# Patient Record
Sex: MALE | Race: WHITE | NOT HISPANIC OR LATINO | Employment: FULL TIME | URBAN - NONMETROPOLITAN AREA
[De-identification: names, ages, dates, MRNs, and addresses within clinical notes are randomized per-mention and may not be internally consistent; named-entity substitution may affect disease eponyms.]

---

## 2017-03-09 RX ORDER — FLUTICASONE PROPIONATE 50 MCG
SPRAY, SUSPENSION (ML) NASAL
Qty: 1 EACH | Refills: 6 | Status: SHIPPED | OUTPATIENT
Start: 2017-03-09

## 2017-05-08 ENCOUNTER — OFFICE VISIT (OUTPATIENT)
Dept: FAMILY MEDICINE CLINIC | Facility: CLINIC | Age: 37
End: 2017-05-08

## 2017-05-08 VITALS
WEIGHT: 211 LBS | TEMPERATURE: 97.5 F | BODY MASS INDEX: 26.37 KG/M2 | DIASTOLIC BLOOD PRESSURE: 85 MMHG | SYSTOLIC BLOOD PRESSURE: 137 MMHG | OXYGEN SATURATION: 96 % | HEART RATE: 78 BPM | RESPIRATION RATE: 16 BRPM

## 2017-05-08 DIAGNOSIS — R53.81 MALAISE AND FATIGUE: ICD-10-CM

## 2017-05-08 DIAGNOSIS — E53.8 VITAMIN B12 DEFICIENCY: ICD-10-CM

## 2017-05-08 DIAGNOSIS — R53.83 MALAISE AND FATIGUE: ICD-10-CM

## 2017-05-08 DIAGNOSIS — R06.02 SHORTNESS OF BREATH: ICD-10-CM

## 2017-05-08 DIAGNOSIS — R51.9 PERSISTENT HEADACHES: ICD-10-CM

## 2017-05-08 DIAGNOSIS — R42 DIZZINESS AND GIDDINESS: ICD-10-CM

## 2017-05-08 DIAGNOSIS — E55.9 VITAMIN D DEFICIENCY: ICD-10-CM

## 2017-05-08 DIAGNOSIS — R07.89 OTHER CHEST PAIN: Primary | ICD-10-CM

## 2017-05-08 DIAGNOSIS — R22.31 AXILLARY MASS, RIGHT: ICD-10-CM

## 2017-05-08 PROCEDURE — 99214 OFFICE O/P EST MOD 30 MIN: CPT | Performed by: INTERNAL MEDICINE

## 2017-05-08 RX ORDER — LORATADINE 10 MG/1
CAPSULE, LIQUID FILLED ORAL
COMMUNITY
End: 2021-08-30

## 2017-07-10 ENCOUNTER — TELEPHONE (OUTPATIENT)
Dept: FAMILY MEDICINE CLINIC | Facility: CLINIC | Age: 37
End: 2017-07-10

## 2017-07-11 NOTE — TELEPHONE ENCOUNTER
Left message to return call. Need updated number for patient, number is no longer a working number.

## 2017-07-12 NOTE — TELEPHONE ENCOUNTER
Still unable to reach patient or wife, left message informing that 'test must be done per specialist only' and to call with any additional questions.

## 2017-08-01 ENCOUNTER — TELEPHONE (OUTPATIENT)
Dept: FAMILY MEDICINE CLINIC | Facility: CLINIC | Age: 37
End: 2017-08-01

## 2017-08-11 ENCOUNTER — APPOINTMENT (OUTPATIENT)
Dept: CT IMAGING | Facility: HOSPITAL | Age: 37
End: 2017-08-11

## 2017-09-25 ENCOUNTER — TELEPHONE (OUTPATIENT)
Dept: FAMILY MEDICINE CLINIC | Facility: CLINIC | Age: 37
End: 2017-09-25

## 2017-09-25 RX ORDER — CEPHALEXIN 500 MG/1
500 CAPSULE ORAL 3 TIMES DAILY
Qty: 30 CAPSULE | Refills: 0 | Status: SHIPPED | OUTPATIENT
Start: 2017-09-25 | End: 2017-10-05

## 2017-09-27 ENCOUNTER — OFFICE VISIT (OUTPATIENT)
Dept: SURGERY | Facility: CLINIC | Age: 37
End: 2017-09-27

## 2017-09-27 ENCOUNTER — OFFICE VISIT (OUTPATIENT)
Dept: PULMONOLOGY | Facility: CLINIC | Age: 37
End: 2017-09-27

## 2017-09-27 ENCOUNTER — OFFICE VISIT (OUTPATIENT)
Dept: NEUROLOGY | Facility: CLINIC | Age: 37
End: 2017-09-27

## 2017-09-27 VITALS
HEART RATE: 74 BPM | WEIGHT: 207 LBS | HEIGHT: 75 IN | RESPIRATION RATE: 16 BRPM | OXYGEN SATURATION: 97 % | SYSTOLIC BLOOD PRESSURE: 110 MMHG | DIASTOLIC BLOOD PRESSURE: 80 MMHG | BODY MASS INDEX: 25.74 KG/M2

## 2017-09-27 VITALS
HEART RATE: 71 BPM | HEIGHT: 75 IN | SYSTOLIC BLOOD PRESSURE: 140 MMHG | WEIGHT: 208 LBS | BODY MASS INDEX: 25.86 KG/M2 | TEMPERATURE: 98.1 F | OXYGEN SATURATION: 99 % | DIASTOLIC BLOOD PRESSURE: 76 MMHG

## 2017-09-27 VITALS
OXYGEN SATURATION: 96 % | WEIGHT: 207.8 LBS | SYSTOLIC BLOOD PRESSURE: 138 MMHG | HEART RATE: 81 BPM | BODY MASS INDEX: 25.84 KG/M2 | HEIGHT: 75 IN | DIASTOLIC BLOOD PRESSURE: 72 MMHG

## 2017-09-27 DIAGNOSIS — G43.019 INTRACTABLE MIGRAINE WITHOUT AURA AND WITHOUT STATUS MIGRAINOSUS: Primary | ICD-10-CM

## 2017-09-27 DIAGNOSIS — D17.21 LIPOMA OF RIGHT UPPER EXTREMITY: ICD-10-CM

## 2017-09-27 DIAGNOSIS — R06.02 SHORTNESS OF BREATH: Primary | ICD-10-CM

## 2017-09-27 DIAGNOSIS — R22.31 AXILLARY MASS, RIGHT: Primary | ICD-10-CM

## 2017-09-27 DIAGNOSIS — F51.01 PRIMARY INSOMNIA: ICD-10-CM

## 2017-09-27 DIAGNOSIS — J30.89 OTHER ALLERGIC RHINITIS: ICD-10-CM

## 2017-09-27 DIAGNOSIS — J45.40 MODERATE PERSISTENT ASTHMA WITHOUT COMPLICATION: ICD-10-CM

## 2017-09-27 PROCEDURE — 99204 OFFICE O/P NEW MOD 45 MIN: CPT | Performed by: SURGERY

## 2017-09-27 PROCEDURE — 10022 PR FINE NEEDLE ASP;W/IMAGING GUIDANCE: CPT | Performed by: SURGERY

## 2017-09-27 PROCEDURE — 99244 OFF/OP CNSLTJ NEW/EST MOD 40: CPT | Performed by: INTERNAL MEDICINE

## 2017-09-27 PROCEDURE — 99214 OFFICE O/P EST MOD 30 MIN: CPT | Performed by: NURSE PRACTITIONER

## 2017-09-27 PROCEDURE — 95012 NITRIC OXIDE EXP GAS DETER: CPT | Performed by: INTERNAL MEDICINE

## 2017-09-27 RX ORDER — ALBUTEROL SULFATE 90 UG/1
2 AEROSOL, METERED RESPIRATORY (INHALATION) EVERY 4 HOURS PRN
Qty: 1 INHALER | Refills: 5 | Status: SHIPPED | OUTPATIENT
Start: 2017-09-27

## 2017-09-27 RX ORDER — IBUPROFEN 200 MG
200 TABLET ORAL EVERY 6 HOURS PRN
COMMUNITY

## 2017-09-27 RX ORDER — AMITRIPTYLINE HYDROCHLORIDE 25 MG/1
50 TABLET, FILM COATED ORAL NIGHTLY
Qty: 30 TABLET | Refills: 3 | Status: SHIPPED | OUTPATIENT
Start: 2017-09-27 | End: 2021-06-11

## 2017-09-27 NOTE — PROGRESS NOTES
Patient: Horace Butler    YOB: 1980    Date: 09/27/2017    Primary Care Provider: Karina Chapa MD    Chief complaint:   Chief Complaint   Patient presents with   • Mass     Right axillary mass        Subjective .     History of present illness:  The patient is here today for the evaluation and treatment for a right axillary mass.  He first noticed a mass under his right arm three years ago and is now causing discomfort.  He states that the pain under his arm does radiate to his chest.  He has had a ct scan and ultrasound in the past.  The patient denies redness or drainage.  CT scan of the right axilla was negative, ultrasound results are unknown.  Patient also had a history of a thyroid nodule with negative FNA.  Patient had occasions of redness but no drainage and no open wound.  No history of hidradenitis.    Review of Systems   Constitutional: Positive for chills. Negative for fever and unexpected weight change.   HENT: Negative for trouble swallowing and voice change.    Eyes: Negative for visual disturbance.   Respiratory: Positive for shortness of breath. Negative for apnea, cough, chest tightness and wheezing.    Cardiovascular: Positive for chest pain. Negative for palpitations and leg swelling.   Gastrointestinal: Negative for abdominal distention, abdominal pain, anal bleeding, blood in stool, constipation, diarrhea, nausea, rectal pain and vomiting.   Endocrine: Negative for cold intolerance and heat intolerance.   Genitourinary: Negative for difficulty urinating, dysuria, flank pain, scrotal swelling and testicular pain.   Musculoskeletal: Negative for back pain, gait problem and joint swelling.   Skin: Negative for color change, rash and wound.   Neurological: Negative for dizziness, syncope, speech difficulty, weakness, numbness and headaches.   Hematological: Negative for adenopathy. Does not bruise/bleed easily.   Psychiatric/Behavioral: Negative for confusion. The patient is not  "nervous/anxious.        Allergies:  No Known Allergies    Medications:    Current Outpatient Prescriptions:   •  amitriptyline (ELAVIL) 50 MG tablet, Take  by mouth., Disp: , Rfl:   •  azelastine (ASTELIN) 0.1 % nasal spray, instill 2 sprays into each nostril once daily, Disp: , Rfl: 0  •  cephalexin (KEFLEX) 500 MG capsule, Take 1 capsule by mouth 3 (Three) Times a Day for 10 days., Disp: 30 capsule, Rfl: 0  •  Cetirizine HCl (ZYRTEC ALLERGY) 10 MG capsule, Take 1 capsule by mouth., Disp: , Rfl:   •  fluticasone (FLONASE) 50 MCG/ACT nasal spray, One spray in each nostril twice daily., Disp: 1 each, Rfl: 6  •  Loratadine (CLARITIN) 10 MG capsule, Take  by mouth., Disp: , Rfl:   •  oxymetazoline (AFRIN 12 HOUR) 0.05 % nasal spray, into each nostril 2 (two) times a day., Disp: , Rfl:   •  SUMAtriptan (IMITREX) 50 MG tablet, Take one tablet at onset of headache. May repeat dose one time in 2 hours if headache not relieved., Disp: 10 tablet, Rfl: 6    History\"  Past Medical History:   Diagnosis Date   • Arthritis    • Asthma    • Cardiac murmur    • Hyperlipidemia    • Migraine        Past Surgical History:   Procedure Laterality Date   • TOOTH EXTRACTION         Family History   Problem Relation Age of Onset   • Other Mother      ARTERIOSCLEROTIC CARDIOVASCULAR DISEASE, CEREBROVASCULAR ACCIDENT   • Arthritis Mother    • Hypertension Mother    • Migraines Mother    • Thyroid disease Mother    • Stroke Mother    • Lung cancer Father    • Other Other      ARTERIOSCLEROTIC CARDIOVASCULAR DISEASE,CEREBROVASCULAR ACCIDENT   • Arthritis Other    • Stroke Other    • Hypertension Other        Social History   Substance Use Topics   • Smoking status: Former Smoker     Types: Cigarettes, Electronic Cigarette   • Smokeless tobacco: Former User      Comment: Patient quit cigarettes but still smokes an electronic cigarette   • Alcohol use Yes      Comment: minimum consumption        Objective     Vital Signs:   /76 (BP " "Location: Right arm)  Pulse 71  Temp 98.1 °F (36.7 °C) (Temporal Artery )   Ht 75\" (190.5 cm)  Wt 208 lb (94.3 kg)  SpO2 99%  BMI 26 kg/m2    Physical Exam:   General Appearance:    Alert, cooperative, in no acute distress   Head:    Normocephalic, without obvious abnormality, atraumatic   Eyes:            Lids and lashes normal, conjunctivae and sclerae normal, no   icterus, no pallor, corneas clear, PERRLA   Ears:    Ears appear intact with no abnormalities noted   Throat:   No oral lesions, no thrush, oral mucosa moist   Neck:   No adenopathy, supple, trachea midline, no thyromegaly, no   carotid bruit, no JVD   Lungs:     Clear to auscultation,respirations regular, even and                  unlabored    Heart:    Regular rhythm and normal rate, normal S1 and S2, no            murmur, no gallop, no rub, no click   Chest Wall:    No abnormalities observed   Abdomen:     Normal bowel sounds, no masses, no organomegaly, soft        non-tender, non-distended, no guarding, no rebound                tenderness   Extremities:   Moves all extremities well, no edema, no cyanosis, no             Redness.  5 cm lipoma right axilla no skin involvement.     Pulses:   Pulses palpable and equal bilaterally   Skin:   No bleeding, bruising or rash   Lymph nodes:   No palpable adenopathy   Neurologic:   Cranial nerves 2 - 12 grossly intact, sensation intact, DTR       present and equal bilaterally     Results Review:   I reviewed the patient's new clinical results.    Assessment/Plan     1. Axillary mass, right    2. Lipoma of right upper extremity        Schedule ultrasound right axilla today. Patient has an underlying lymph node on the right.  Associated with a mass.  Would recommend FNA today.  Procedure: FNA right axilla with ultrasound guidance    I recommend a FNA of the right breast lesion. The procedure and risks were clearly explained including bleeding, infection and requirement for re-biopsy and the patient " understood these and wishes to proceed.  Follow-up one week to review pathology result.    The patient was brought to the procedure room. Consent and time out were performed. The area was prepped and draped in the usual fashion. 1% lidocaine with epinephrine was infused locally. A 20G needle was used for biopsy under ultrasound guidance. Minimal blood loss had occurred and hemostasis had been well controlled with pressure. . The patient tolerated the procedure and there were no complications. We will make a f/u appointment to discuss results.      I discussed the patients findings and my recommendations with patient    Review of Systems was reviewed and confirmed as accurate today.    Electronically signed by Arya Oh MD  09/27/17      .    Scribed for Arya Oh MD by Mikayla De. 9/27/2017  9:48 AM

## 2017-10-02 ENCOUNTER — OFFICE VISIT (OUTPATIENT)
Dept: SURGERY | Facility: CLINIC | Age: 37
End: 2017-10-02

## 2017-10-02 VITALS
BODY MASS INDEX: 26.24 KG/M2 | SYSTOLIC BLOOD PRESSURE: 130 MMHG | WEIGHT: 211 LBS | DIASTOLIC BLOOD PRESSURE: 70 MMHG | TEMPERATURE: 97.8 F | HEIGHT: 75 IN | HEART RATE: 71 BPM | OXYGEN SATURATION: 99 %

## 2017-10-02 DIAGNOSIS — R59.0 LYMPHADENOPATHY OF HEAD AND NECK REGION: Primary | ICD-10-CM

## 2017-10-02 PROCEDURE — 99213 OFFICE O/P EST LOW 20 MIN: CPT | Performed by: SURGERY

## 2017-10-02 NOTE — PROGRESS NOTES
"Patient: Horace Butler    YOB: 1980    Date: 10/02/2017    Primary Care Provider: Karina Chapa MD    Chief Complaint:   Chief Complaint   Patient presents with   • Follow-up     patient is here for follow up on right axillary biopsy       History: Patient is here for a follow up on right axillary biopsy.  Pathology shows negative for malignant cells.  Patient states that he is having more pain with the area. Patient state that he has been having trouble moving his arm since the biopsy.   Patient states that the pain is throbbing and sharp.  Patient states that the pain is constant.No fever or chills.  Patient relieved that pathology report is benign.    Review of Systems    Vital Signs  /70  Pulse 71  Temp 97.8 °F (36.6 °C)  Ht 75\" (190.5 cm)  Wt 211 lb (95.7 kg)  SpO2 99%  BMI 26.37 kg/m2    Allergies:  No Known Allergies    Medications:    Current Outpatient Prescriptions:   •  albuterol (VENTOLIN HFA) 108 (90 Base) MCG/ACT inhaler, Inhale 2 puffs Every 4 (Four) Hours As Needed for Wheezing or Shortness of Air., Disp: 1 inhaler, Rfl: 5  •  amitriptyline (ELAVIL) 25 MG tablet, Take 2 tablets by mouth Every Night., Disp: 30 tablet, Rfl: 3  •  azelastine (ASTELIN) 0.1 % nasal spray, instill 2 sprays into each nostril once daily, Disp: , Rfl: 0  •  cephalexin (KEFLEX) 500 MG capsule, Take 1 capsule by mouth 3 (Three) Times a Day for 10 days., Disp: 30 capsule, Rfl: 0  •  Cetirizine HCl (ZYRTEC ALLERGY) 10 MG capsule, Take 1 capsule by mouth., Disp: , Rfl:   •  fluticasone (FLONASE) 50 MCG/ACT nasal spray, One spray in each nostril twice daily., Disp: 1 each, Rfl: 6  •  ibuprofen (ADVIL,MOTRIN) 200 MG tablet, Take 200 mg by mouth Every 6 (Six) Hours As Needed for Mild Pain ., Disp: , Rfl:   •  Loratadine (CLARITIN) 10 MG capsule, Take  by mouth., Disp: , Rfl:   •  mometasone-formoterol (DULERA 200) 200-5 MCG/ACT inhaler, Inhale 2 puffs 2 (Two) Times a Day. Rinse mouth with water after use., " Disp: 1 g, Rfl: 5  •  oxymetazoline (AFRIN 12 HOUR) 0.05 % nasal spray, into each nostril 2 (two) times a day., Disp: , Rfl:   •  SUMAtriptan (IMITREX) 50 MG tablet, Take one tablet at onset of headache. May repeat dose one time in 2 hours if headache not relieved., Disp: 10 tablet, Rfl: 6    Physical Exam:   General Appearance:    Alert, cooperative, in no acute distress   Head:    Normocephalic, without obvious abnormality, atraumatic   Lungs:     Clear to auscultation,respirations regular, even and                  unlabored    Heart:    Regular rhythm and normal rate, normal S1 and S2, no            murmur, no gallop, no rub, no click   Abdomen:     Normal bowel sounds, no masses, no organomegaly, soft        non-tender, non-distended, no guarding, no rebound                tenderness   Extremities:   Moves all extremities well, no edema, no cyanosis, no             redness   Pulses:   Pulses palpable and equal bilaterally   Skin:   No bleeding, bruising or rash      Assessment/Plan     1. Lymphadenopathy of head and neck region      Recommend repeat ultrasound in 3 months.  If symptoms do not improve, patient may need for resection of the mass.  Electronically signed by Arya Oh MD  10/02/17   Scribed for Arya Oh MD by Sheeba Patel. 10/2/2017  4:31 PM

## 2017-10-13 ENCOUNTER — APPOINTMENT (OUTPATIENT)
Dept: CT IMAGING | Facility: HOSPITAL | Age: 37
End: 2017-10-13

## 2017-11-10 ENCOUNTER — HOSPITAL ENCOUNTER (OUTPATIENT)
Dept: SLEEP MEDICINE | Facility: HOSPITAL | Age: 37
Setting detail: THERAPIES SERIES
End: 2017-11-10

## 2018-05-10 ENCOUNTER — TELEPHONE (OUTPATIENT)
Dept: INTERNAL MEDICINE | Facility: CLINIC | Age: 38
End: 2018-05-10

## 2018-05-10 DIAGNOSIS — J30.1 CHRONIC SEASONAL ALLERGIC RHINITIS DUE TO POLLEN: Primary | ICD-10-CM

## 2018-08-31 ENCOUNTER — OFFICE VISIT (OUTPATIENT)
Dept: NEUROLOGY | Facility: CLINIC | Age: 38
End: 2018-08-31

## 2018-08-31 VITALS
HEART RATE: 75 BPM | WEIGHT: 211 LBS | OXYGEN SATURATION: 98 % | SYSTOLIC BLOOD PRESSURE: 130 MMHG | BODY MASS INDEX: 26.24 KG/M2 | DIASTOLIC BLOOD PRESSURE: 82 MMHG | HEIGHT: 75 IN

## 2018-08-31 DIAGNOSIS — G47.19 EXCESSIVE DAYTIME SLEEPINESS: ICD-10-CM

## 2018-08-31 DIAGNOSIS — G43.019 INTRACTABLE MIGRAINE WITHOUT AURA AND WITHOUT STATUS MIGRAINOSUS: Primary | ICD-10-CM

## 2018-08-31 DIAGNOSIS — G43.119 INTRACTABLE MIGRAINE WITH AURA WITHOUT STATUS MIGRAINOSUS: ICD-10-CM

## 2018-08-31 DIAGNOSIS — F51.04 PSYCHOPHYSIOLOGICAL INSOMNIA: ICD-10-CM

## 2018-08-31 DIAGNOSIS — R41.3 MEMORY LOSS: ICD-10-CM

## 2018-08-31 PROCEDURE — 99214 OFFICE O/P EST MOD 30 MIN: CPT | Performed by: NURSE PRACTITIONER

## 2018-10-26 ENCOUNTER — OFFICE VISIT (OUTPATIENT)
Dept: INTERNAL MEDICINE | Facility: CLINIC | Age: 38
End: 2018-10-26

## 2018-10-26 ENCOUNTER — APPOINTMENT (OUTPATIENT)
Dept: LAB | Facility: HOSPITAL | Age: 38
End: 2018-10-26

## 2018-10-26 VITALS
OXYGEN SATURATION: 97 % | HEART RATE: 92 BPM | TEMPERATURE: 97.6 F | RESPIRATION RATE: 16 BRPM | HEIGHT: 75 IN | DIASTOLIC BLOOD PRESSURE: 82 MMHG | SYSTOLIC BLOOD PRESSURE: 120 MMHG | BODY MASS INDEX: 25.74 KG/M2 | WEIGHT: 207 LBS

## 2018-10-26 DIAGNOSIS — J01.00 ACUTE MAXILLARY SINUSITIS, RECURRENCE NOT SPECIFIED: ICD-10-CM

## 2018-10-26 DIAGNOSIS — R53.83 FATIGUE, UNSPECIFIED TYPE: Primary | ICD-10-CM

## 2018-10-26 LAB
25(OH)D3 SERPL-MCNC: 32.6 NG/ML
BASOPHILS # BLD AUTO: 0.06 10*3/MM3 (ref 0–0.2)
BASOPHILS NFR BLD AUTO: 0.7 % (ref 0–2.5)
DEPRECATED RDW RBC AUTO: 36.6 FL (ref 37–54)
EOSINOPHIL # BLD AUTO: 0.14 10*3/MM3 (ref 0–0.7)
EOSINOPHIL NFR BLD AUTO: 1.7 % (ref 0–7)
ERYTHROCYTE [DISTWIDTH] IN BLOOD BY AUTOMATED COUNT: 11.4 % (ref 11.5–14.5)
HCT VFR BLD AUTO: 45.6 % (ref 42–52)
HGB BLD-MCNC: 15.3 G/DL (ref 14–18)
IMM GRANULOCYTES # BLD: 0.04 10*3/MM3 (ref 0–0.06)
IMM GRANULOCYTES NFR BLD: 0.5 % (ref 0–0.6)
LYMPHOCYTES # BLD AUTO: 2.56 10*3/MM3 (ref 0.6–3.4)
LYMPHOCYTES NFR BLD AUTO: 31.4 % (ref 10–50)
MCH RBC QN AUTO: 29.7 PG (ref 27–31)
MCHC RBC AUTO-ENTMCNC: 33.6 G/DL (ref 30–37)
MCV RBC AUTO: 88.5 FL (ref 80–94)
MONOCYTES # BLD AUTO: 0.63 10*3/MM3 (ref 0–0.9)
MONOCYTES NFR BLD AUTO: 7.7 % (ref 0–12)
NEUTROPHILS # BLD AUTO: 4.73 10*3/MM3 (ref 2–6.9)
NEUTROPHILS NFR BLD AUTO: 58 % (ref 37–80)
NRBC BLD MANUAL-RTO: 0 /100 WBC (ref 0–0)
PLATELET # BLD AUTO: 411 10*3/MM3 (ref 130–400)
PMV BLD AUTO: 9.1 FL (ref 6–12)
RBC # BLD AUTO: 5.15 10*6/MM3 (ref 4.7–6.1)
WBC NRBC COR # BLD: 8.16 10*3/MM3 (ref 4.8–10.8)

## 2018-10-26 PROCEDURE — 85025 COMPLETE CBC W/AUTO DIFF WBC: CPT | Performed by: NURSE PRACTITIONER

## 2018-10-26 PROCEDURE — 84403 ASSAY OF TOTAL TESTOSTERONE: CPT | Performed by: NURSE PRACTITIONER

## 2018-10-26 PROCEDURE — 36415 COLL VENOUS BLD VENIPUNCTURE: CPT | Performed by: NURSE PRACTITIONER

## 2018-10-26 PROCEDURE — 99214 OFFICE O/P EST MOD 30 MIN: CPT | Performed by: NURSE PRACTITIONER

## 2018-10-26 PROCEDURE — 84402 ASSAY OF FREE TESTOSTERONE: CPT | Performed by: NURSE PRACTITIONER

## 2018-10-26 PROCEDURE — 82306 VITAMIN D 25 HYDROXY: CPT | Performed by: NURSE PRACTITIONER

## 2018-10-26 RX ORDER — AMOXICILLIN AND CLAVULANATE POTASSIUM 875; 125 MG/1; MG/1
1 TABLET, FILM COATED ORAL 2 TIMES DAILY
Qty: 20 TABLET | Refills: 0 | Status: SHIPPED | OUTPATIENT
Start: 2018-10-26 | End: 2018-11-05

## 2018-10-29 ENCOUNTER — TELEPHONE (OUTPATIENT)
Dept: INTERNAL MEDICINE | Facility: CLINIC | Age: 38
End: 2018-10-29

## 2018-10-29 LAB
TESTOST FREE SERPL-MCNC: 1.2 PG/ML (ref 8.7–25.1)
TESTOST SERPL-MCNC: 119.4 NG/DL (ref 264–916)

## 2018-10-30 ENCOUNTER — TELEPHONE (OUTPATIENT)
Dept: INTERNAL MEDICINE | Facility: CLINIC | Age: 38
End: 2018-10-30

## 2018-11-01 ENCOUNTER — TELEPHONE (OUTPATIENT)
Dept: INTERNAL MEDICINE | Facility: CLINIC | Age: 38
End: 2018-11-01

## 2018-11-02 ENCOUNTER — APPOINTMENT (OUTPATIENT)
Dept: SLEEP MEDICINE | Facility: HOSPITAL | Age: 38
End: 2018-11-02

## 2018-11-09 ENCOUNTER — APPOINTMENT (OUTPATIENT)
Dept: MRI IMAGING | Facility: HOSPITAL | Age: 38
End: 2018-11-09

## 2018-11-15 DIAGNOSIS — R79.89 LOW TESTOSTERONE: Primary | ICD-10-CM

## 2018-12-07 ENCOUNTER — APPOINTMENT (OUTPATIENT)
Dept: SLEEP MEDICINE | Facility: HOSPITAL | Age: 38
End: 2018-12-07

## 2018-12-07 ENCOUNTER — HOSPITAL ENCOUNTER (OUTPATIENT)
Dept: MRI IMAGING | Facility: HOSPITAL | Age: 38
Discharge: HOME | End: 2018-12-07
Admitting: NURSE PRACTITIONER

## 2018-12-07 DIAGNOSIS — G43.019 INTRACTABLE MIGRAINE WITHOUT AURA AND WITHOUT STATUS MIGRAINOSUS: ICD-10-CM

## 2018-12-07 PROCEDURE — 70551 MRI BRAIN STEM W/O DYE: CPT

## 2018-12-19 ENCOUNTER — OFFICE VISIT (OUTPATIENT)
Dept: NEUROLOGY | Facility: CLINIC | Age: 38
End: 2018-12-19

## 2018-12-19 VITALS — WEIGHT: 207 LBS | OXYGEN SATURATION: 98 % | BODY MASS INDEX: 25.74 KG/M2 | HEART RATE: 76 BPM | HEIGHT: 75 IN

## 2018-12-19 DIAGNOSIS — G43.119 INTRACTABLE MIGRAINE WITH AURA WITHOUT STATUS MIGRAINOSUS: Primary | ICD-10-CM

## 2018-12-19 DIAGNOSIS — G47.19 EXCESSIVE DAYTIME SLEEPINESS: ICD-10-CM

## 2018-12-19 PROCEDURE — 99213 OFFICE O/P EST LOW 20 MIN: CPT | Performed by: NURSE PRACTITIONER

## 2019-01-01 PROBLEM — G47.19 EXCESSIVE DAYTIME SLEEPINESS: Status: ACTIVE | Noted: 2019-01-01

## 2019-06-20 ENCOUNTER — OFFICE VISIT (OUTPATIENT)
Dept: INTERNAL MEDICINE | Facility: CLINIC | Age: 39
End: 2019-06-20

## 2019-06-20 VITALS
TEMPERATURE: 97.6 F | RESPIRATION RATE: 15 BRPM | BODY MASS INDEX: 25.61 KG/M2 | WEIGHT: 206 LBS | OXYGEN SATURATION: 100 % | DIASTOLIC BLOOD PRESSURE: 85 MMHG | SYSTOLIC BLOOD PRESSURE: 136 MMHG | HEART RATE: 73 BPM | HEIGHT: 75 IN

## 2019-06-20 DIAGNOSIS — R79.89 ELEVATED PLATELET COUNT: ICD-10-CM

## 2019-06-20 DIAGNOSIS — R00.2 PALPITATIONS: ICD-10-CM

## 2019-06-20 DIAGNOSIS — R73.9 HYPERGLYCEMIA: ICD-10-CM

## 2019-06-20 DIAGNOSIS — R42 DIZZINESS: ICD-10-CM

## 2019-06-20 DIAGNOSIS — R53.83 FATIGUE, UNSPECIFIED TYPE: Primary | ICD-10-CM

## 2019-06-20 PROCEDURE — 99214 OFFICE O/P EST MOD 30 MIN: CPT | Performed by: NURSE PRACTITIONER

## 2019-06-20 PROCEDURE — 93000 ELECTROCARDIOGRAM COMPLETE: CPT | Performed by: NURSE PRACTITIONER

## 2019-06-20 RX ORDER — MECLIZINE HYDROCHLORIDE 25 MG/1
25 TABLET ORAL 3 TIMES DAILY PRN
Qty: 60 TABLET | Refills: 0 | Status: SHIPPED | OUTPATIENT
Start: 2019-06-20 | End: 2019-08-22 | Stop reason: SDUPTHER

## 2019-06-21 LAB
ALBUMIN SERPL-MCNC: 4.9 G/DL (ref 3.5–5)
ALBUMIN/GLOB SERPL: 1.4 G/DL (ref 1–2)
ALP SERPL-CCNC: 112 U/L (ref 38–126)
ALT SERPL-CCNC: 36 U/L (ref 13–69)
AST SERPL-CCNC: 31 U/L (ref 15–46)
BASOPHILS # BLD AUTO: 0.04 10*3/MM3 (ref 0–0.2)
BASOPHILS NFR BLD AUTO: 0.7 % (ref 0–1.5)
BILIRUB SERPL-MCNC: 0.3 MG/DL (ref 0.2–1.3)
BUN SERPL-MCNC: 15 MG/DL (ref 7–20)
BUN/CREAT SERPL: 15 (ref 6.3–21.9)
CALCIUM SERPL-MCNC: 10.1 MG/DL (ref 8.4–10.2)
CHLORIDE SERPL-SCNC: 97 MMOL/L (ref 98–107)
CO2 SERPL-SCNC: 31 MMOL/L (ref 26–30)
CREAT SERPL-MCNC: 1 MG/DL (ref 0.6–1.3)
EOSINOPHIL # BLD AUTO: 0.15 10*3/MM3 (ref 0–0.4)
EOSINOPHIL NFR BLD AUTO: 2.6 % (ref 0.3–6.2)
ERYTHROCYTE [DISTWIDTH] IN BLOOD BY AUTOMATED COUNT: 12.1 % (ref 12.3–15.4)
GLOBULIN SER CALC-MCNC: 3.4 GM/DL
GLUCOSE SERPL-MCNC: 94 MG/DL (ref 74–98)
HBA1C MFR BLD: 5.9 % (ref 4.8–5.6)
HCT VFR BLD AUTO: 42.4 % (ref 37.5–51)
HGB BLD-MCNC: 14.5 G/DL (ref 13–17.7)
IMM GRANULOCYTES # BLD AUTO: 0.01 10*3/MM3 (ref 0–0.05)
IMM GRANULOCYTES NFR BLD AUTO: 0.2 % (ref 0–0.5)
LYMPHOCYTES # BLD AUTO: 1.73 10*3/MM3 (ref 0.7–3.1)
LYMPHOCYTES NFR BLD AUTO: 29.7 % (ref 19.6–45.3)
MCH RBC QN AUTO: 29.9 PG (ref 26.6–33)
MCHC RBC AUTO-ENTMCNC: 34.2 G/DL (ref 31.5–35.7)
MCV RBC AUTO: 87.4 FL (ref 79–97)
MONOCYTES # BLD AUTO: 0.48 10*3/MM3 (ref 0.1–0.9)
MONOCYTES NFR BLD AUTO: 8.2 % (ref 5–12)
NEUTROPHILS # BLD AUTO: 3.41 10*3/MM3 (ref 1.7–7)
NEUTROPHILS NFR BLD AUTO: 58.6 % (ref 42.7–76)
NRBC BLD AUTO-RTO: 0 /100 WBC (ref 0–0.2)
PLATELET # BLD AUTO: 334 10*3/MM3 (ref 140–450)
POTASSIUM SERPL-SCNC: 4.6 MMOL/L (ref 3.5–5.1)
PROT SERPL-MCNC: 8.3 G/DL (ref 6.3–8.2)
RBC # BLD AUTO: 4.85 10*6/MM3 (ref 4.14–5.8)
SODIUM SERPL-SCNC: 141 MMOL/L (ref 137–145)
T4 FREE SERPL-MCNC: 1.34 NG/DL (ref 0.78–2.19)
TSH SERPL DL<=0.005 MIU/L-ACNC: 3.96 MIU/ML (ref 0.47–4.68)
VIT B12 SERPL-MCNC: 598 PG/ML (ref 239–931)
WBC # BLD AUTO: 5.82 10*3/MM3 (ref 3.4–10.8)

## 2019-08-22 ENCOUNTER — OFFICE VISIT (OUTPATIENT)
Dept: INTERNAL MEDICINE | Facility: CLINIC | Age: 39
End: 2019-08-22

## 2019-08-22 VITALS
OXYGEN SATURATION: 96 % | SYSTOLIC BLOOD PRESSURE: 112 MMHG | HEART RATE: 74 BPM | DIASTOLIC BLOOD PRESSURE: 78 MMHG | TEMPERATURE: 97.2 F | RESPIRATION RATE: 16 BRPM | BODY MASS INDEX: 25.61 KG/M2 | WEIGHT: 206 LBS | HEIGHT: 75 IN

## 2019-08-22 DIAGNOSIS — R53.83 MALAISE AND FATIGUE: ICD-10-CM

## 2019-08-22 DIAGNOSIS — R53.81 MALAISE AND FATIGUE: ICD-10-CM

## 2019-08-22 DIAGNOSIS — R73.01 IMPAIRED FASTING GLUCOSE: ICD-10-CM

## 2019-08-22 DIAGNOSIS — R42 DIZZINESS: ICD-10-CM

## 2019-08-22 DIAGNOSIS — E78.2 MIXED HYPERLIPIDEMIA: ICD-10-CM

## 2019-08-22 DIAGNOSIS — I10 BENIGN ESSENTIAL HYPERTENSION: Primary | ICD-10-CM

## 2019-08-22 PROCEDURE — 99214 OFFICE O/P EST MOD 30 MIN: CPT | Performed by: INTERNAL MEDICINE

## 2019-08-22 RX ORDER — AMLODIPINE BESYLATE 2.5 MG/1
2.5 TABLET ORAL NIGHTLY
Qty: 30 TABLET | Refills: 3 | Status: SHIPPED | OUTPATIENT
Start: 2019-08-22 | End: 2021-06-11

## 2019-08-22 RX ORDER — MECLIZINE HYDROCHLORIDE 25 MG/1
25 TABLET ORAL 3 TIMES DAILY PRN
Qty: 90 TABLET | Refills: 0 | Status: SHIPPED | OUTPATIENT
Start: 2019-08-22

## 2019-08-22 NOTE — PROGRESS NOTES
Subjective   Horace Butler is a 39 y.o. male.     Chief Complaint   Patient presents with   • Fatigue   • Dizziness   • Hypertension   • Hyperlipidemia   • Blood Sugar Problem       History of Present Illness   Patient is here to follow-up on his blood pressure, he states his blood pressure stays elevated at home the systolic above 140, he complains of dizziness and fatigue ,the patient is  here to follow up on the cholesterol and sugar and is trying to follow a diet. The patient is   due to get lab work done .  T  Hypertension   Pertinent negatives include no chest pain, palpitations or shortness of breath.    The following portions of the patient's history were reviewed and updated as appropriate: allergies, current medications, past family history, past medical history, past social history, past surgical history and problem list.    Review of Systems   Constitutional: Positive for fatigue. Negative for appetite change and fever.   HENT: Negative for congestion, ear discharge, ear pain, sinus pressure and sore throat.    Eyes: Negative for pain and discharge.   Respiratory: Negative for cough, shortness of breath and wheezing.    Cardiovascular: Negative for chest pain, palpitations and leg swelling.   Gastrointestinal: Negative for abdominal pain, constipation, diarrhea, nausea and vomiting.   Endocrine: Negative for cold intolerance and heat intolerance.   Genitourinary: Negative for dysuria and flank pain.   Musculoskeletal: Negative for arthralgias and joint swelling.   Skin: Negative for pallor and rash.   Allergic/Immunologic: Negative for environmental allergies and food allergies.   Neurological: Positive for dizziness. Negative for weakness and numbness.   Hematological: Negative for adenopathy. Does not bruise/bleed easily.   Psychiatric/Behavioral: Negative for behavioral problems and dysphoric mood. The patient is not nervous/anxious.          Current Outpatient Medications:   •  albuterol (VENTOLIN  "HFA) 108 (90 Base) MCG/ACT inhaler, Inhale 2 puffs Every 4 (Four) Hours As Needed for Wheezing or Shortness of Air., Disp: 1 inhaler, Rfl: 5  •  amitriptyline (ELAVIL) 25 MG tablet, Take 2 tablets by mouth Every Night., Disp: 30 tablet, Rfl: 3  •  azelastine (ASTELIN) 0.1 % nasal spray, instill 2 sprays into each nostril once daily, Disp: , Rfl: 0  •  Cetirizine HCl (ZYRTEC ALLERGY) 10 MG capsule, Take 1 capsule by mouth., Disp: , Rfl:   •  fluticasone (FLONASE) 50 MCG/ACT nasal spray, One spray in each nostril twice daily., Disp: 1 each, Rfl: 6  •  ibuprofen (ADVIL,MOTRIN) 200 MG tablet, Take 200 mg by mouth Every 6 (Six) Hours As Needed for Mild Pain ., Disp: , Rfl:   •  Loratadine (CLARITIN) 10 MG capsule, Take  by mouth., Disp: , Rfl:   •  meclizine (ANTIVERT) 25 MG tablet, Take 1 tablet by mouth 3 (Three) Times a Day As Needed for dizziness., Disp: 90 tablet, Rfl: 0  •  mometasone-formoterol (DULERA 200) 200-5 MCG/ACT inhaler, Inhale 2 puffs 2 (Two) Times a Day. Rinse mouth with water after use., Disp: 1 g, Rfl: 5  •  oxymetazoline (AFRIN 12 HOUR) 0.05 % nasal spray, into each nostril 2 (two) times a day., Disp: , Rfl:   •  SUMAtriptan (IMITREX) 50 MG tablet, Take one tablet at onset of headache. May repeat dose one time in 2 hours if headache not relieved., Disp: 10 tablet, Rfl: 6  •  amLODIPine (NORVASC) 2.5 MG tablet, Take 1 tablet by mouth Every Night., Disp: 30 tablet, Rfl: 3    Objective     Blood pressure 112/78, pulse 74, temperature 97.2 °F (36.2 °C), resp. rate 16, height 190.5 cm (75\"), weight 93.4 kg (206 lb), SpO2 96 %.    Physical Exam   Constitutional: He is oriented to person, place, and time. He appears well-developed and well-nourished. No distress.   HENT:   Head: Normocephalic and atraumatic.   Right Ear: External ear normal.   Left Ear: External ear normal.   Nose: Nose normal.   Mouth/Throat: Oropharynx is clear and moist.   Eyes: Conjunctivae and EOM are normal. Pupils are equal, round, " and reactive to light.   Neck: Normal range of motion. Neck supple. No thyromegaly present.   Cardiovascular: Normal rate, regular rhythm and normal heart sounds.   Pulmonary/Chest: Effort normal. No respiratory distress.   Abdominal: Soft. He exhibits no distension. There is no tenderness. There is no guarding.   Musculoskeletal: Normal range of motion. He exhibits no edema.   Lymphadenopathy:     He has no cervical adenopathy.   Neurological: He is alert and oriented to person, place, and time.   No gross motor or sensory deficits   Skin: Skin is warm and dry. He is not diaphoretic. No erythema.   Psychiatric: He has a normal mood and affect.   Nursing note and vitals reviewed.    Patient's Body mass index is 25.75 kg/m². BMI is within normal parameters. No follow-up required..      Results for orders placed or performed in visit on 06/20/19   TSH   Result Value Ref Range    TSH 3.960 0.470 - 4.680 mIU/mL   T4, free   Result Value Ref Range    Free T4 1.34 0.78 - 2.19 ng/dL   Hemoglobin A1c   Result Value Ref Range    Hemoglobin A1C 5.90 (H) 4.80 - 5.60 %   Comprehensive metabolic panel   Result Value Ref Range    Glucose 94 74 - 98 mg/dL    BUN 15 7 - 20 mg/dL    Creatinine 1.00 0.60 - 1.30 mg/dL    eGFR Non African Am 84 >60 mL/min/1.73    eGFR African Am 101 >60 mL/min/1.73    BUN/Creatinine Ratio 15.0 6.3 - 21.9    Sodium 141 137 - 145 mmol/L    Potassium 4.6 3.5 - 5.1 mmol/L    Chloride 97 (L) 98 - 107 mmol/L    Total CO2 31.0 (H) 26.0 - 30.0 mmol/L    Calcium 10.1 8.4 - 10.2 mg/dL    Total Protein 8.3 (H) 6.3 - 8.2 g/dL    Albumin 4.90 3.50 - 5.00 g/dL    Globulin 3.4 gm/dL    A/G Ratio 1.4 1.0 - 2.0 g/dL    Total Bilirubin 0.3 0.2 - 1.3 mg/dL    Alkaline Phosphatase 112 38 - 126 U/L    AST (SGOT) 31 15 - 46 U/L    ALT (SGPT) 36 13 - 69 U/L   Vitamin B12   Result Value Ref Range    Vitamin B-12 598 239 - 931 pg/mL   CBC w AUTO Differential   Result Value Ref Range    WBC 5.82 3.40 - 10.80 10*3/mm3    RBC  4.85 4.14 - 5.80 10*6/mm3    Hemoglobin 14.5 13.0 - 17.7 g/dL    Hematocrit 42.4 37.5 - 51.0 %    MCV 87.4 79.0 - 97.0 fL    MCH 29.9 26.6 - 33.0 pg    MCHC 34.2 31.5 - 35.7 g/dL    RDW 12.1 (L) 12.3 - 15.4 %    Platelets 334 140 - 450 10*3/mm3    Neutrophil Rel % 58.6 42.7 - 76.0 %    Lymphocyte Rel % 29.7 19.6 - 45.3 %    Monocyte Rel % 8.2 5.0 - 12.0 %    Eosinophil Rel % 2.6 0.3 - 6.2 %    Basophil Rel % 0.7 0.0 - 1.5 %    Neutrophils Absolute 3.41 1.70 - 7.00 10*3/mm3    Lymphocytes Absolute 1.73 0.70 - 3.10 10*3/mm3    Monocytes Absolute 0.48 0.10 - 0.90 10*3/mm3    Eosinophils Absolute 0.15 0.00 - 0.40 10*3/mm3    Basophils Absolute 0.04 0.00 - 0.20 10*3/mm3    Immature Granulocyte Rel % 0.2 0.0 - 0.5 %    Immature Grans Absolute 0.01 0.00 - 0.05 10*3/mm3    nRBC 0.0 0.0 - 0.2 /100 WBC         Assessment/Plan   Horace was seen today for fatigue, dizziness, hypertension, hyperlipidemia and blood sugar problem.    Diagnoses and all orders for this visit:    Benign essential hypertension    Mixed hyperlipidemia  -     CBC & Differential  -     Comprehensive Metabolic Panel  -     Lipid Panel    Impaired fasting glucose  -     Hemoglobin A1c    Dizziness    Malaise and fatigue    Other orders  -     meclizine (ANTIVERT) 25 MG tablet; Take 1 tablet by mouth 3 (Three) Times a Day As Needed for dizziness.  -     amLODIPine (NORVASC) 2.5 MG tablet; Take 1 tablet by mouth Every Night.      Plan:  1.  Benign essential hypertension: Will  Start norvasc 2.5 mg qhs.  low-sodium diet advised  2.mixed hyperlipidemia: will obtain   fasting CMP and lipid panel.  Diet and exercise counseled,     3. impaired glucose    : will obtain   fasting CMP  and hba1c  , diet and exercise counseled ,    4. Dizziness /vertigo : trial with meclizine  5. Fatigue  : labs reviewed        Karina Chapa MD

## 2021-04-19 ENCOUNTER — TELEPHONE (OUTPATIENT)
Dept: INTERNAL MEDICINE | Facility: CLINIC | Age: 41
End: 2021-04-19

## 2021-04-19 NOTE — TELEPHONE ENCOUNTER
Caller: Fernando Butler    Relationship: Emergency Contact    Best call back number: [phone]  What is the medical concern/diagnosis: BREATHING ISSUES/ SHORTNESS OF BREATH ALONG WITH DIZZINESS      What specialty or service is being requested: PULMONOLOGIST      What is the provider, practice or medical service name: DR. JESUS     What is the office phone number: [phone]      Any additional details: PATIENT'S WIFE FERNANDO IS ASKING FOR A REFERRAL TO A LUNG DOCTOR. PATIENT WAS RECENTLY DIAGNOSED WITH COVID ON 04/13/2021. PATIENT STATED IT DOESN'T HAVE TO BE THIS PARTICULAR ONE IF HE ISN'T AVAILABLE.

## 2021-04-19 NOTE — TELEPHONE ENCOUNTER
Patient's wife, Keysha, called back and is requesting to speak with Komal about this more in depth. She states that patient has already been to the cardiologist and she has been in contact with Dr. Peña's office to get patient scheduled but since it's been 3 years, they are requiring a new referral. She said that she is concerned because patient already has asthma and now has a dx of COVID so thinks he really needs to be checked out by a pulmonologist. Please advise.

## 2021-04-23 ENCOUNTER — TELEMEDICINE (OUTPATIENT)
Dept: INTERNAL MEDICINE | Facility: CLINIC | Age: 41
End: 2021-04-23

## 2021-04-23 DIAGNOSIS — U07.1 COVID-19: Primary | ICD-10-CM

## 2021-04-23 PROCEDURE — 99213 OFFICE O/P EST LOW 20 MIN: CPT | Performed by: NURSE PRACTITIONER

## 2021-04-23 NOTE — PROGRESS NOTES
Office Visit      Patient Name: Horace Butler  : 1980   MRN: [id number]   Care Team: Patient Care Team:  Karina Chapa MD as PCP - General (Internal Medicine)    Chief Complaint  Covid-19 Home Monitoring Video Visit (pt tested postive for COVID on 13th, rapid and send off both. )    Subjective     Subjective      Horace Butler presents to [facility] for recent diagnosis of COVID-19. Tested positive . Did not require hospitalization. Had symptoms including weakness, shortness of breath, fever, sore throat, headache, and chest pain that waxed and waned-- has not had anymore of chest pain since Monday. Is feeling somewhat better today. Does have a history of asthma and tried albuterol for his shortness of breath which helped minimally.  Only takes dulera as needed. His shortness of breath has improved today and describes as minimal.  Worse upon awakening.  He denies chest pain, headache, weakness, and worsening shortness of breath today.  Originally wanted to go see Dr. Peña for symptoms however now with his improvement he is seeking advice on what to expect with recovery.    Review of Systems   Constitutional: Positive for fatigue. Negative for chills and fever.   HENT: Negative for congestion, postnasal drip, sinus pressure, sneezing, sore throat, swollen glands and trouble swallowing.    Respiratory: Positive for shortness of breath (Minimal). Negative for cough and wheezing.    Cardiovascular: Negative for chest pain, palpitations and leg swelling.   Gastrointestinal: Negative for abdominal pain, diarrhea, nausea and vomiting.   Neurological: Negative for headache.   Psychiatric/Behavioral: Negative for sleep disturbance.       Objective     Objective   Vital Signs:   There were no vitals taken for this visit.    Physical Exam   Constitutional: He appears well-developed and well-nourished. He does not appear ill. No distress.   Amended as this is a video visit   HENT:    Nose: Nose normal.   Eyes: Pupils are equal, round, and reactive to light.   Pulmonary/Chest: Effort normal.  No respiratory distress. He no audible wheeze...  Abdominal: There is no abdominal tenderness (Per his palpation).   Neurological: He is alert.   Skin: No rash (None visible and per his report no rash) noted.   Psychiatric: He has a normal mood and affect.        Assessment / Plan         Assessment/Plan  Problem List Items Addressed This Visit     None      Visit Diagnoses     COVID-19    -  Primary    Reassuring that symptoms are improving.  We discussed in detail the long-term effects of COVID-19 and what he he can expect while recovering.  I urged him to present to the ED with any chest pain, worsening shortness of breath unrelieved by albuterol, or severe headache.  We discussed increased risk of blood clots with COVID-19 that would need to be managed by emergency physician.  He verbalizes understanding.  He wishes to delay referral to pulmonary.  I instructed him to RTC if symptoms are not improving or worsen for evaluation.        I spent 20 minutes caring for Horace on this date of service. This time includes time spent by me in the following activities:preparing for the visit, counseling and educating the patient/family/caregiver and documenting information in the medical record    Follow Up   Return if symptoms worsen or fail to improve.  Patient was given instructions and counseling regarding his condition or for health maintenance advice. Please see specific information pulled into the AVS if appropriate.     You have chosen to receive care through a telehealth visit.  Do you consent to use a video/audio connection for your medical care today? Yes     ARMAAN Crabtree  [facility]

## 2021-04-23 NOTE — PROGRESS NOTES
You have chosen to receive care through a telephone visit. Do you consent to use a telephone visit for your medical care today? Yes  Noel, Janine GAFFNEY, and Sofie all involved in patients care today

## 2021-06-11 ENCOUNTER — OFFICE VISIT (OUTPATIENT)
Dept: INTERNAL MEDICINE | Facility: CLINIC | Age: 41
End: 2021-06-11

## 2021-06-11 VITALS
DIASTOLIC BLOOD PRESSURE: 82 MMHG | SYSTOLIC BLOOD PRESSURE: 128 MMHG | TEMPERATURE: 97.5 F | HEART RATE: 78 BPM | OXYGEN SATURATION: 98 % | HEIGHT: 75 IN | WEIGHT: 215 LBS | BODY MASS INDEX: 26.73 KG/M2

## 2021-06-11 DIAGNOSIS — E78.2 MODERATE MIXED HYPERLIPIDEMIA NOT REQUIRING STATIN THERAPY: ICD-10-CM

## 2021-06-11 DIAGNOSIS — E29.1 HYPOGONADISM IN MALE: ICD-10-CM

## 2021-06-11 DIAGNOSIS — Z00.00 ANNUAL PHYSICAL EXAM: Primary | ICD-10-CM

## 2021-06-11 DIAGNOSIS — E66.3 OVERWEIGHT WITH BODY MASS INDEX (BMI) OF 26 TO 26.9 IN ADULT: ICD-10-CM

## 2021-06-11 DIAGNOSIS — I10 BENIGN ESSENTIAL HYPERTENSION: ICD-10-CM

## 2021-06-11 DIAGNOSIS — R73.01 IMPAIRED FASTING GLUCOSE: ICD-10-CM

## 2021-06-11 DIAGNOSIS — R61 HYPERHIDROSIS: ICD-10-CM

## 2021-06-11 DIAGNOSIS — R53.82 CHRONIC FATIGUE: ICD-10-CM

## 2021-06-11 DIAGNOSIS — Z11.59 NEED FOR HEPATITIS C SCREENING TEST: ICD-10-CM

## 2021-06-11 DIAGNOSIS — J30.89 NON-SEASONAL ALLERGIC RHINITIS, UNSPECIFIED TRIGGER: ICD-10-CM

## 2021-06-11 DIAGNOSIS — Z12.5 SCREENING PSA (PROSTATE SPECIFIC ANTIGEN): ICD-10-CM

## 2021-06-11 PROCEDURE — 99396 PREV VISIT EST AGE 40-64: CPT | Performed by: PHYSICIAN ASSISTANT

## 2021-06-11 RX ORDER — GLYCOPYRROLATE 1 MG/1
1 TABLET ORAL 2 TIMES DAILY
Qty: 60 TABLET | Refills: 2 | Status: SHIPPED | OUTPATIENT
Start: 2021-06-11 | End: 2021-09-30

## 2021-06-11 NOTE — PROGRESS NOTES
Male Physical Note      Patient Name: Horace Butler  : 1980   MRN: [id number]     Chief Complaint:    Chief Complaint   Patient presents with   • Annual Exam       History of Present Illness: Horace Butler is a 41 y.o. male who is here today for their annual health maintenance and physical.     He is bothered buy fatigue and would like testosterone rechecked and if low he would like to be referred to urology for treatment.     He has had trouble with hyperhidrosis for many years.  He reports that predominant areas of excessive sweating are the head, abdomen, back and axillary regions.    Today he reports that allergies are severe.  He previously tried Astelin which did not help much. He is currently using flonase daily and rotates zyrtec, claritin and xyzal monthly.  He also uses Afrin continuously despite warnings of rebound congestion.     He has not been taking amlodipine for hypertension. Hypertension seems controlled without medication.  He denies chest pain, dyspnea, orthopnea, palpitations, lower extremity edema, headaches, weakness, visual disturbances or confusion.         Subjective         Past Medical History, Social History, Family History and Care Team were all reviewed with patient and updated as appropriate.       Current Outpatient Medications:   •  albuterol (VENTOLIN HFA) 108 (90 Base) MCG/ACT inhaler, Inhale 2 puffs Every 4 (Four) Hours As Needed for Wheezing or Shortness of Air., Disp: 1 inhaler, Rfl: 5  •  azelastine (ASTELIN) 0.1 % nasal spray, instill 2 sprays into each nostril once daily, Disp: , Rfl: 0  •  Cetirizine HCl (ZYRTEC ALLERGY) 10 MG capsule, Take 1 capsule by mouth., Disp: , Rfl:   •  fluticasone (FLONASE) 50 MCG/ACT nasal spray, One spray in each nostril twice daily., Disp: 1 each, Rfl: 6  •  ibuprofen (ADVIL,MOTRIN) 200 MG tablet, Take 200 mg by mouth Every 6 (Six) Hours As Needed for Mild Pain ., Disp: , Rfl:   •  Loratadine (CLARITIN) 10 MG capsule, Take  by  mouth., Disp: , Rfl:   •  meclizine (ANTIVERT) 25 MG tablet, Take 1 tablet by mouth 3 (Three) Times a Day As Needed for dizziness., Disp: 90 tablet, Rfl: 0  •  mometasone-formoterol (DULERA 200) 200-5 MCG/ACT inhaler, Inhale 2 puffs 2 (Two) Times a Day. Rinse mouth with water after use., Disp: 1 g, Rfl: 5  •  SUMAtriptan (IMITREX) 50 MG tablet, Take one tablet at onset of headache. May repeat dose one time in 2 hours if headache not relieved., Disp: 10 tablet, Rfl: 6  •  glycopyrrolate (Robinul) 1 MG tablet, Take 1 tablet by mouth 2 (two) times a day., Disp: 60 tablet, Rfl: 2    No Known Allergies      Depression: PHQ-2 Depression Screening  Little interest or pleasure in doing things? 0   Feeling down, depressed, or hopeless? 0   PHQ-2 Total Score 0     The ASCVD Risk score (Hillsdalemanny GÓMEZ Jr., et al., 2013) failed to calculate for the following reasons:    Cannot find a previous HDL lab    Cannot find a previous total cholesterol lab    Health Maintenance:   Health Maintenance Summary          Overdue - ANNUAL PHYSICAL (Yearly) Overdue - never done    No completion, postpone, or frequency change history exists for this topic.          Overdue - COVID-19 Vaccine (1) Overdue - never done    No completion, postpone, or frequency change history exists for this topic.          Overdue - TDAP/TD VACCINES (1 - Tdap) Overdue - never done    No completion, postpone, or frequency change history exists for this topic.          Ordered - HEPATITIS C SCREENING (Once) Ordered on 6/11/2021    No completion, postpone, or frequency change history exists for this topic.          Ordered - LIPID PANEL (Yearly) Ordered on 6/11/2021 09/06/2016  Lipid panel    02/09/2016  Lipid panel    07/17/2015  Lipid panel          INFLUENZA VACCINE (Yearly - August to March) Next due on 8/1/2021    No completion, postpone, or frequency change history exists for this topic.          Pneumococcal Vaccine 0-64 (Series Information) Aged Out    No  "completion, postpone, or frequency change history exists for this topic.               PSA(Over age 50): No results found for: PSA  US Aorta (For male smokers, age 65):  N/a     Osteoporosis screening:   • Men: history of low trauma fracture, androgen deprivation therapy for prostate cancer, hypogonadism, primary hyperparathyroidism, intestinal disorders.       Objective     Physical Exam:  Vital Signs:   Vitals:    06/11/21 1634   BP: 128/82   Pulse: 78   Temp: 97.5 °F (36.4 °C)   SpO2: 98%   Weight: 97.5 kg (215 lb)   Height: 190.5 cm (75\")     Body mass index is 26.87 kg/m².     Physical Exam  Vitals and nursing note reviewed.   Constitutional:       General: He is awake. He is not in acute distress.     Appearance: He is well-developed and overweight. He is not ill-appearing, toxic-appearing or diaphoretic.      Interventions: Face mask in place.   HENT:      Head: Normocephalic and atraumatic.      Right Ear: Ear canal and external ear normal. There is no impacted cerumen.      Left Ear: Ear canal and external ear normal. There is no impacted cerumen.      Nose: Congestion present.   Eyes:      General: No scleral icterus.     Conjunctiva/sclera: Conjunctivae normal.      Pupils: Pupils are equal, round, and reactive to light.   Neck:      Thyroid: No thyromegaly.   Cardiovascular:      Rate and Rhythm: Normal rate and regular rhythm.      Heart sounds: Normal heart sounds. No murmur heard.   No friction rub. No gallop.    Pulmonary:      Effort: Pulmonary effort is normal. No respiratory distress.      Breath sounds: Normal breath sounds. No stridor. No wheezing or rales.   Chest:      Chest wall: No tenderness.   Abdominal:      General: Bowel sounds are normal. There is no distension.      Palpations: Abdomen is soft. There is no mass.      Tenderness: There is no abdominal tenderness. There is no right CVA tenderness, left CVA tenderness, guarding or rebound.      Hernia: No hernia is present.   "   Musculoskeletal:         General: No tenderness. Normal range of motion.      Cervical back: Normal range of motion and neck supple. No rigidity or tenderness.      Right lower leg: No edema.      Left lower leg: No edema.   Lymphadenopathy:      Cervical: No cervical adenopathy.   Skin:     General: Skin is warm and dry.      Capillary Refill: Capillary refill takes less than 2 seconds.      Coloration: Skin is not pale.      Findings: No rash.   Neurological:      Mental Status: He is alert and oriented to person, place, and time.      Cranial Nerves: No cranial nerve deficit.      Sensory: No sensory deficit.      Coordination: Coordination normal.      Gait: Gait normal.      Deep Tendon Reflexes: Reflexes normal.   Psychiatric:         Mood and Affect: Mood normal.         Behavior: Behavior normal. Behavior is cooperative.         Thought Content: Thought content normal.         Judgment: Judgment normal.         Procedures    Assessment / Plan      Assessment/Plan:   Diagnoses and all orders for this visit:    1. Annual physical exam (Primary)    2. Overweight with body mass index (BMI) of 26 to 26.9 in adult  Patient's Body mass index is 26.87 kg/m². indicating that he is overweight (BMI 25-29.9). Obesity-related health conditions include the following: hypertension and dyslipidemias. BMI is is above average; BMI management plan is completed. We discussed Dietary information added to AVS..    3. Moderate mixed hyperlipidemia not requiring statin therapy  -     Lipid Panel    4. Impaired fasting glucose  -     Hemoglobin A1c    5. Non-seasonal allergic rhinitis, unspecified trigger  -     Ambulatory Referral to Allergy  Continue Flonase and daily antihistamine.  Advised to discontinue Afrin nasal spray as it has caused persistent rebound congestion.    6. Screening PSA (prostate specific antigen)  -     PSA Screen    7. Need for hepatitis C screening test  -     Hepatitis C Antibody    8. Hypogonadism in  male  -     Testosterone, Free, Total    9. Benign essential hypertension  -     Comprehensive Metabolic Panel  Stable without medication.  Follow heart healthy/low salt diet.  Avoid processed foods.Monitor blood pressure as discussed.  Exercise as tolerated up to 30 minutes 5 days per week.     10. Hyperhidrosis  -     glycopyrrolate (Robinul) 1 MG tablet; Take 1 tablet by mouth 2 (two) times a day.  Dispense: 60 tablet; Refill: 2  Begin trial of Robinul for hyperhidrosis.  Follow-up with Dr. Chapa in 3 months.    11. Chronic fatigue  -     TSH Rfx On Abnormal To Free T4         Follow Up:   Return in about 3 months (around 9/11/2021) for Eduard, Next scheduled follow up.    Healthcare Maintenance:   Patient Counseling:  --Nutrition: Stressed importance of moderation in sodium/caffeine intake, saturated fat and cholesterol, caloric balance, sufficient intake of fresh fruits, vegetables, fiber, calcium and iron.  --Discussed the issue of calcium supplement, and the daily use of baby aspirin if applicable.  --Exercise: Stressed the importance of regular exercise.   --Substance Abuse: Discussed cessation/primary prevention of tobacco (if applicable, alcohol, or other drug use (if applicable); driving or other dangerous activities under the influence; availability of treatment for abuse.    --Sexuality: Discussed sexually transmitted diseases, partner selection, use of condoms, avoidance of unintended pregnancy  and contraceptive alternatives.   --Injury prevention: Discussed safety belts, safety helmets, smoke detector, smoking near bedding or upholstery.   --Dental health: Discussed importance of regular tooth brushing, flossing, and dental visits.  --Immunizations reviewed.  --Discussed benefits of screening colonoscopy (if applicable).  --After hours service discussed with patient.    Gini Abernathy PA-C  [facility]

## 2021-07-03 ENCOUNTER — LAB (OUTPATIENT)
Dept: LAB | Facility: HOSPITAL | Age: 41
End: 2021-07-03

## 2021-07-03 LAB
ALBUMIN SERPL-MCNC: 4.4 G/DL (ref 3.5–5.2)
ALBUMIN/GLOB SERPL: 1.5 G/DL
ALP SERPL-CCNC: 78 U/L (ref 39–117)
ALT SERPL W P-5'-P-CCNC: 49 U/L (ref 1–41)
ANION GAP SERPL CALCULATED.3IONS-SCNC: 9.2 MMOL/L (ref 5–15)
AST SERPL-CCNC: 48 U/L (ref 1–40)
BILIRUB SERPL-MCNC: 0.3 MG/DL (ref 0–1.2)
BUN SERPL-MCNC: 16 MG/DL (ref 6–20)
BUN/CREAT SERPL: 17.4 (ref 7–25)
CALCIUM SPEC-SCNC: 9.5 MG/DL (ref 8.6–10.5)
CHLORIDE SERPL-SCNC: 101 MMOL/L (ref 98–107)
CHOLEST SERPL-MCNC: 263 MG/DL (ref 0–200)
CO2 SERPL-SCNC: 27.8 MMOL/L (ref 22–29)
CREAT SERPL-MCNC: 0.92 MG/DL (ref 0.76–1.27)
GFR SERPL CREATININE-BSD FRML MDRD: 91 ML/MIN/1.73
GLOBULIN UR ELPH-MCNC: 2.9 GM/DL
GLUCOSE SERPL-MCNC: 114 MG/DL (ref 65–99)
HBA1C MFR BLD: 5.8 % (ref 4.8–5.6)
HCV AB SER DONR QL: NORMAL
HDLC SERPL-MCNC: 34 MG/DL (ref 40–60)
LDLC SERPL CALC-MCNC: 145 MG/DL (ref 0–100)
LDLC/HDLC SERPL: 4.11 {RATIO}
POTASSIUM SERPL-SCNC: 3.8 MMOL/L (ref 3.5–5.2)
PROT SERPL-MCNC: 7.3 G/DL (ref 6–8.5)
PSA SERPL-MCNC: 0.2 NG/ML (ref 0–4)
SODIUM SERPL-SCNC: 138 MMOL/L (ref 136–145)
TRIGL SERPL-MCNC: 447 MG/DL (ref 0–150)
TSH SERPL DL<=0.05 MIU/L-ACNC: 2.19 UIU/ML (ref 0.27–4.2)
VLDLC SERPL-MCNC: 84 MG/DL (ref 5–40)

## 2021-07-03 PROCEDURE — G0103 PSA SCREENING: HCPCS | Performed by: PHYSICIAN ASSISTANT

## 2021-07-03 PROCEDURE — 80061 LIPID PANEL: CPT | Performed by: PHYSICIAN ASSISTANT

## 2021-07-03 PROCEDURE — 84403 ASSAY OF TOTAL TESTOSTERONE: CPT | Performed by: PHYSICIAN ASSISTANT

## 2021-07-03 PROCEDURE — 84402 ASSAY OF FREE TESTOSTERONE: CPT | Performed by: PHYSICIAN ASSISTANT

## 2021-07-03 PROCEDURE — 36415 COLL VENOUS BLD VENIPUNCTURE: CPT | Performed by: PHYSICIAN ASSISTANT

## 2021-07-03 PROCEDURE — 86803 HEPATITIS C AB TEST: CPT | Performed by: PHYSICIAN ASSISTANT

## 2021-07-03 PROCEDURE — 84443 ASSAY THYROID STIM HORMONE: CPT | Performed by: PHYSICIAN ASSISTANT

## 2021-07-03 PROCEDURE — 83036 HEMOGLOBIN GLYCOSYLATED A1C: CPT | Performed by: PHYSICIAN ASSISTANT

## 2021-07-03 PROCEDURE — 80053 COMPREHEN METABOLIC PANEL: CPT | Performed by: PHYSICIAN ASSISTANT

## 2021-07-06 PROBLEM — R73.03 PREDIABETES: Status: ACTIVE | Noted: 2021-07-06

## 2021-07-10 LAB
TESTOST FREE SERPL-MCNC: 3.1 PG/ML (ref 6.8–21.5)
TESTOST SERPL-MCNC: 99 NG/DL (ref 264–916)

## 2021-07-12 DIAGNOSIS — E29.1 HYPOGONADISM IN MALE: Primary | ICD-10-CM

## 2021-07-12 DIAGNOSIS — R53.82 CHRONIC FATIGUE: ICD-10-CM

## 2021-08-30 ENCOUNTER — OFFICE VISIT (OUTPATIENT)
Dept: UROLOGY | Facility: CLINIC | Age: 41
End: 2021-08-30

## 2021-08-30 VITALS
TEMPERATURE: 98.2 F | DIASTOLIC BLOOD PRESSURE: 76 MMHG | RESPIRATION RATE: 14 BRPM | SYSTOLIC BLOOD PRESSURE: 132 MMHG | HEART RATE: 80 BPM | OXYGEN SATURATION: 98 %

## 2021-08-30 DIAGNOSIS — E29.1 HYPOGONADISM IN MALE: Primary | ICD-10-CM

## 2021-08-30 PROCEDURE — 99204 OFFICE O/P NEW MOD 45 MIN: CPT | Performed by: UROLOGY

## 2021-08-30 RX ORDER — TESTOSTERONE CYPIONATE 100 MG/ML
100 INJECTION, SOLUTION INTRAMUSCULAR WEEKLY
Qty: 10 ML | Refills: 1 | Status: SHIPPED | OUTPATIENT
Start: 2021-08-30 | End: 2022-03-04

## 2021-08-30 RX ORDER — BLOOD PRESSURE TEST KIT
KIT MISCELLANEOUS
Qty: 100 EACH | Refills: 11 | Status: SHIPPED | OUTPATIENT
Start: 2021-08-30

## 2021-09-16 ENCOUNTER — TELEPHONE (OUTPATIENT)
Dept: UROLOGY | Facility: CLINIC | Age: 41
End: 2021-09-16

## 2021-09-29 DIAGNOSIS — R61 HYPERHIDROSIS: ICD-10-CM

## 2021-09-30 RX ORDER — GLYCOPYRROLATE 1 MG/1
TABLET ORAL
Qty: 60 TABLET | Refills: 0 | Status: SHIPPED | OUTPATIENT
Start: 2021-09-30

## 2021-10-29 ENCOUNTER — TELEPHONE (OUTPATIENT)
Dept: UROLOGY | Facility: CLINIC | Age: 41
End: 2021-10-29

## 2022-03-02 ENCOUNTER — LAB (OUTPATIENT)
Dept: LAB | Facility: HOSPITAL | Age: 42
End: 2022-03-02

## 2022-03-02 PROCEDURE — 85014 HEMATOCRIT: CPT | Performed by: UROLOGY

## 2022-03-02 PROCEDURE — 84403 ASSAY OF TOTAL TESTOSTERONE: CPT | Performed by: UROLOGY

## 2022-03-02 PROCEDURE — 85018 HEMOGLOBIN: CPT | Performed by: UROLOGY

## 2022-03-04 ENCOUNTER — OFFICE VISIT (OUTPATIENT)
Dept: UROLOGY | Facility: CLINIC | Age: 42
End: 2022-03-04

## 2022-03-04 VITALS
SYSTOLIC BLOOD PRESSURE: 132 MMHG | BODY MASS INDEX: 26.73 KG/M2 | HEART RATE: 81 BPM | TEMPERATURE: 97.5 F | HEIGHT: 75 IN | OXYGEN SATURATION: 93 % | WEIGHT: 215 LBS | DIASTOLIC BLOOD PRESSURE: 86 MMHG

## 2022-03-04 DIAGNOSIS — E28.0 ESTRADIOL EXCESS: ICD-10-CM

## 2022-03-04 DIAGNOSIS — E29.1 HYPOGONADISM IN MALE: Primary | ICD-10-CM

## 2022-03-04 PROCEDURE — 99214 OFFICE O/P EST MOD 30 MIN: CPT | Performed by: UROLOGY

## 2022-03-04 RX ORDER — TESTOSTERONE CYPIONATE 100 MG/ML
100 INJECTION, SOLUTION INTRAMUSCULAR WEEKLY
Qty: 10 ML | Refills: 2 | Status: SHIPPED | OUTPATIENT
Start: 2022-03-04 | End: 2022-08-19 | Stop reason: RX

## 2022-03-04 RX ORDER — ANASTROZOLE 1 MG/1
1 TABLET ORAL WEEKLY
Qty: 30 TABLET | Refills: 1 | Status: SHIPPED | OUTPATIENT
Start: 2022-03-04

## 2022-03-04 NOTE — PROGRESS NOTES
"Chief Complaint   Patient presents with   • Follow-up     Patient here today for a 4 month fu with concerns of the testo inj. Patient states hes been off inj for a month.        HPI  Ms. Butler is a 42 y.o. male with history below in assessment, who presents for follow up.     At this visit pt c/o nipple sensitivity.    Past Medical History:   Diagnosis Date   • Arthritis    • Asthma    • Benign essential hypertension 6/11/2021   • Cardiac murmur    • Migraine    • Moderate mixed hyperlipidemia not requiring statin therapy 6/21/2016   • Prediabetes 7/6/2021       Past Surgical History:   Procedure Laterality Date   • LYMPH NODE BIOPSY      Under right arm   • TOOTH EXTRACTION           Current Outpatient Medications:   •  albuterol (VENTOLIN HFA) 108 (90 Base) MCG/ACT inhaler, Inhale 2 puffs Every 4 (Four) Hours As Needed for Wheezing or Shortness of Air., Disp: 1 inhaler, Rfl: 5  •  Alcohol Swabs pads, Clean area prior to injection, Disp: 100 each, Rfl: 11  •  azelastine (ASTELIN) 0.1 % nasal spray, instill 2 sprays into each nostril once daily, Disp: , Rfl: 0  •  fluticasone (FLONASE) 50 MCG/ACT nasal spray, One spray in each nostril twice daily., Disp: 1 each, Rfl: 6  •  glycopyrrolate (ROBINUL) 1 MG tablet, Take 1 tablet by mouth twice daily, Disp: 60 tablet, Rfl: 0  •  ibuprofen (ADVIL,MOTRIN) 200 MG tablet, Take 200 mg by mouth Every 6 (Six) Hours As Needed for Mild Pain ., Disp: , Rfl:   •  meclizine (ANTIVERT) 25 MG tablet, Take 1 tablet by mouth 3 (Three) Times a Day As Needed for dizziness., Disp: 90 tablet, Rfl: 0  •  Needle, Disp, 18G X 1-1/2\" misc, Use for drawing up the medication, Disp: 50 each, Rfl: 3  •  Needle, Disp, 23G X 1-1/2\" misc, 1 Device 1 (One) Time Per Week., Disp: 30 each, Rfl: 11  •  SUMAtriptan (IMITREX) 50 MG tablet, Take one tablet at onset of headache. May repeat dose one time in 2 hours if headache not relieved., Disp: 10 tablet, Rfl: 6  •  Syringe, Disposable, 3 ML misc, 1 syringe 1 " "(One) Time Per Week., Disp: 100 each, Rfl: 11  •  testosterone cypionate (DEPO-TESTOSTERONE) 100 MG/ML solution injection, Inject 1 mL into the appropriate muscle as directed by prescriber 1 (One) Time Per Week., Disp: 10 mL, Rfl: 1     Physical Exam  Visit Vitals  /86   Pulse 81   Temp 97.5 °F (36.4 °C)   Ht 190.5 cm (75\")   Wt 97.5 kg (215 lb)   SpO2 93%   BMI 26.87 kg/m²       Labs  Brief Urine Lab Results     None          Lab Results   Component Value Date    GLUCOSE 114 (H) 07/03/2021    CALCIUM 9.5 07/03/2021     07/03/2021    K 3.8 07/03/2021    CO2 27.8 07/03/2021     07/03/2021    BUN 16 07/03/2021    CREATININE 0.92 07/03/2021    EGFRIFAFRI 101 06/20/2019    EGFRIFNONA 91 07/03/2021    BCR 17.4 07/03/2021    ANIONGAP 9.2 07/03/2021       Lab Results   Component Value Date    WBC 5.82 06/20/2019    HGB 16.4 03/02/2022    HCT 49.5 03/02/2022    MCV 87.4 06/20/2019     06/20/2019         Lab Results   Component Value Date    PSA 0.195 07/03/2021       Lab Results   Component Value Date    TESTFRE 3.1 (L) 07/03/2021    TESTOSTEROTT 99 (L) 07/03/2021      Results for ОЛЬГА OCONNOR (MRN [id number]) as of 3/4/2022 09:55   Ref. Range 7/3/2021 08:10 3/2/2022 11:51   Testosterone, Total Latest Ref Range: 249.00 - 836.00 ng/dL 99 (L) 25.70 (L)   Testosterone, Free Latest Ref Range: 6.8 - 21.5 pg/mL 3.1 (L)    TSH Baseline Latest Ref Range: 0.270 - 4.200 uIU/mL 2.190    Total Cholesterol Latest Ref Range: 0 - 200 mg/dL 263 (H)    HDL Cholesterol Latest Ref Range: 40 - 60 mg/dL 34 (L)    LDL Cholesterol  Latest Ref Range: 0 - 100 mg/dL 145 (H)    VLDL Cholesterol Latest Ref Range: 5 - 40 mg/dL 84 (H)    Triglycerides Latest Ref Range: 0 - 150 mg/dL 447 (H)    LDL/HDL Ratio Unknown 4.11    Hemoglobin Latest Ref Range: 13.0 - 17.7 g/dL  16.4   Hematocrit Latest Ref Range: 37.5 - 51.0 %  49.5       Radiographic Studies  No Images in the past 120 days found..        I have reviewed above labs and " imaging.     Assessment  42 y.o. male with chronic hypogonadism, complicated by estradiol excess.   He did well initially but had breast sensitivity and stopped injections 2 mo ago    Plan  1. Restart T injections 100mg weekly. I offered him testopel due to less T level spikes.   2. Add anastrozole  3. FU in 6 mo w/ labs

## 2022-08-19 DIAGNOSIS — E29.1 HYPOGONADISM IN MALE: Primary | ICD-10-CM

## 2022-08-19 RX ORDER — TESTOSTERONE CYPIONATE 200 MG/ML
100 INJECTION, SOLUTION INTRAMUSCULAR WEEKLY
Qty: 2 ML | Refills: 0 | Status: SHIPPED | OUTPATIENT
Start: 2022-08-19 | End: 2022-10-03

## 2022-09-14 NOTE — TELEPHONE ENCOUNTER
Contacted patient and discussed CBC results and vitamin D.  His testosterone level is still not back and told him that I would call him again whenever and the results are back.  He stated an understanding.   Mago Panchal MD   PGY-1

## 2022-09-29 ENCOUNTER — LAB (OUTPATIENT)
Dept: LAB | Facility: HOSPITAL | Age: 42
End: 2022-09-29

## 2022-09-29 PROCEDURE — 84403 ASSAY OF TOTAL TESTOSTERONE: CPT | Performed by: UROLOGY

## 2022-09-29 PROCEDURE — 85014 HEMATOCRIT: CPT | Performed by: UROLOGY

## 2022-09-29 PROCEDURE — 85018 HEMOGLOBIN: CPT | Performed by: UROLOGY

## 2022-10-03 ENCOUNTER — OFFICE VISIT (OUTPATIENT)
Dept: UROLOGY | Facility: CLINIC | Age: 42
End: 2022-10-03

## 2022-10-03 VITALS
TEMPERATURE: 98.1 F | RESPIRATION RATE: 12 BRPM | OXYGEN SATURATION: 98 % | DIASTOLIC BLOOD PRESSURE: 60 MMHG | HEART RATE: 82 BPM | SYSTOLIC BLOOD PRESSURE: 124 MMHG

## 2022-10-03 DIAGNOSIS — E29.1 HYPOGONADISM IN MALE: ICD-10-CM

## 2022-10-03 PROCEDURE — 99214 OFFICE O/P EST MOD 30 MIN: CPT | Performed by: UROLOGY

## 2022-10-03 RX ORDER — LEVOCETIRIZINE DIHYDROCHLORIDE 5 MG/1
5 TABLET, FILM COATED ORAL DAILY
COMMUNITY
Start: 2022-09-22

## 2022-10-03 RX ORDER — PSEUDOEPHEDRINE HCL 30 MG/1
TABLET, FILM COATED ORAL
COMMUNITY
Start: 2022-09-22

## 2022-10-03 RX ORDER — CHLORCYCLIZINE HYDROCHLORIDE AND PSEUDOEPHEDRINE HYDROCHLORIDE 25; 60 MG/1; MG/1
1 TABLET ORAL 2 TIMES DAILY
COMMUNITY
Start: 2022-08-11 | End: 2022-10-03

## 2022-10-03 RX ORDER — IBUPROFEN 800 MG/1
800 TABLET ORAL EVERY 8 HOURS PRN
COMMUNITY
Start: 2022-08-25 | End: 2022-10-03

## 2022-10-03 RX ORDER — KETOCONAZOLE 20 MG/G
CREAM TOPICAL SEE ADMIN INSTRUCTIONS
COMMUNITY
Start: 2022-09-25

## 2022-10-03 RX ORDER — TESTOSTERONE CYPIONATE 200 MG/ML
100 INJECTION, SOLUTION INTRAMUSCULAR WEEKLY
Qty: 10 ML | Refills: 1 | Status: SHIPPED | OUTPATIENT
Start: 2022-10-03

## 2022-10-03 NOTE — PROGRESS NOTES
"Chief Complaint   Patient presents with   • Follow-up   • Hypogonadism        HPI  Ms. Butler is a 42 y.o. male with history below in assessment, who presents for follow up.     At this visit patient doing well and is happy on 100 mg cypionate weekly.  No yoyo effect.  No side effects.    Past Medical History:   Diagnosis Date   • Arthritis    • Asthma    • Benign essential hypertension 6/11/2021   • Cardiac murmur    • Migraine    • Moderate mixed hyperlipidemia not requiring statin therapy 6/21/2016   • Prediabetes 7/6/2021       Past Surgical History:   Procedure Laterality Date   • LYMPH NODE BIOPSY      Under right arm   • TOOTH EXTRACTION           Current Outpatient Medications:   •  albuterol (VENTOLIN HFA) 108 (90 Base) MCG/ACT inhaler, Inhale 2 puffs Every 4 (Four) Hours As Needed for Wheezing or Shortness of Air., Disp: 1 inhaler, Rfl: 5  •  Alcohol Swabs pads, Clean area prior to injection, Disp: 100 each, Rfl: 11  •  anastrozole (Arimidex) 1 MG tablet, Take 1 tablet by mouth 1 (One) Time Per Week., Disp: 30 tablet, Rfl: 1  •  azelastine (ASTELIN) 0.1 % nasal spray, instill 2 sprays into each nostril once daily, Disp: , Rfl: 0  •  fluticasone (FLONASE) 50 MCG/ACT nasal spray, One spray in each nostril twice daily., Disp: 1 each, Rfl: 6  •  glycopyrrolate (ROBINUL) 1 MG tablet, Take 1 tablet by mouth twice daily, Disp: 60 tablet, Rfl: 0  •  ibuprofen (ADVIL,MOTRIN) 200 MG tablet, Take 200 mg by mouth Every 6 (Six) Hours As Needed for Mild Pain ., Disp: , Rfl:   •  ketoconazole (NIZORAL) 2 % cream, Apply  topically to the appropriate area as directed See Admin Instructions. Apply topically to the affected area twice daily, Disp: , Rfl:   •  levocetirizine (XYZAL) 5 MG tablet, Take 5 mg by mouth Daily., Disp: , Rfl:   •  meclizine (ANTIVERT) 25 MG tablet, Take 1 tablet by mouth 3 (Three) Times a Day As Needed for dizziness., Disp: 90 tablet, Rfl: 0  •  Needle, Disp, 18G X 1-1/2\" misc, Use for drawing up the " "medication, Disp: 50 each, Rfl: 3  •  Needle, Disp, 23G X 1-1/2\" misc, 1 Device 1 (One) Time Per Week., Disp: 30 each, Rfl: 11  •  SudoGest 30 MG tablet, TAKE 1 TO 2 TABLETS BY MOUTH TWICE DAILY AS NEEDED, Disp: , Rfl:   •  SUMAtriptan (IMITREX) 50 MG tablet, Take one tablet at onset of headache. May repeat dose one time in 2 hours if headache not relieved., Disp: 10 tablet, Rfl: 6  •  Syringe, Disposable, 3 ML misc, 1 syringe 1 (One) Time Per Week., Disp: 100 each, Rfl: 11  •  Testosterone Cypionate (Depo-Testosterone) 200 MG/ML injection, Inject 0.5 mL into the appropriate muscle as directed by prescriber 1 (One) Time Per Week. NOTE: Your dose of medicine is the same but the amount you inject is HALF. This form is TWICE as strong., Disp: 2 mL, Rfl: 0     Physical Exam  Visit Vitals  /60 (BP Location: Left arm, Patient Position: Sitting, Cuff Size: Adult)   Pulse 82   Temp 98.1 °F (36.7 °C) (Temporal)   Resp 12   SpO2 98%       Labs  Brief Urine Lab Results     None          Lab Results   Component Value Date    GLUCOSE 114 (H) 07/03/2021    CALCIUM 9.5 07/03/2021     07/03/2021    K 3.8 07/03/2021    CO2 27.8 07/03/2021     07/03/2021    BUN 16 07/03/2021    CREATININE 0.92 07/03/2021    EGFRIFAFRI 101 06/20/2019    EGFRIFNONA 91 07/03/2021    BCR 17.4 07/03/2021    ANIONGAP 9.2 07/03/2021       Lab Results   Component Value Date    WBC 5.82 06/20/2019    HGB 15.7 09/29/2022    HCT 46.1 09/29/2022    MCV 87.4 06/20/2019     06/20/2019       Lab Results   Component Value Date    PSA 0.195 07/03/2021       Lab Results   Component Value Date    TESTFRE 3.1 (L) 07/03/2021    TESTOSTEROTT 99 (L) 07/03/2021       Latest Reference Range & Units 09/29/22 11:23   Testosterone, Total 249.00 - 836.00 ng/dL 824.00   Hemoglobin 13.0 - 17.7 g/dL 15.7   Hematocrit 37.5 - 51.0 % 46.1       Radiographic Studies  No Images in the past 120 days found..      I have reviewed above labs and imaging. "     Assessment  42 y.o. male with hypogonadism on 100mg weekly of Testosterone cypionate    Plan  1. Continue 100mg weekly  2. Decrease anastrozole to every other week.  3.  Follow-up in 6 months with labs.

## 2023-04-05 ENCOUNTER — TELEPHONE (OUTPATIENT)
Dept: UROLOGY | Facility: CLINIC | Age: 43
End: 2023-04-05
Payer: COMMERCIAL

## 2023-04-07 ENCOUNTER — LAB (OUTPATIENT)
Dept: LAB | Facility: HOSPITAL | Age: 43
End: 2023-04-07
Payer: COMMERCIAL

## 2023-04-07 PROCEDURE — 84270 ASSAY OF SEX HORMONE GLOBUL: CPT | Performed by: UROLOGY

## 2023-04-07 PROCEDURE — 85014 HEMATOCRIT: CPT | Performed by: UROLOGY

## 2023-04-07 PROCEDURE — 82670 ASSAY OF TOTAL ESTRADIOL: CPT | Performed by: UROLOGY

## 2023-04-07 PROCEDURE — 84403 ASSAY OF TOTAL TESTOSTERONE: CPT | Performed by: UROLOGY

## 2023-04-07 PROCEDURE — 84402 ASSAY OF FREE TESTOSTERONE: CPT | Performed by: UROLOGY

## 2023-04-07 PROCEDURE — 85018 HEMOGLOBIN: CPT | Performed by: UROLOGY

## 2023-04-10 ENCOUNTER — OFFICE VISIT (OUTPATIENT)
Dept: UROLOGY | Facility: CLINIC | Age: 43
End: 2023-04-10
Payer: COMMERCIAL

## 2023-04-10 VITALS
DIASTOLIC BLOOD PRESSURE: 80 MMHG | OXYGEN SATURATION: 96 % | SYSTOLIC BLOOD PRESSURE: 150 MMHG | TEMPERATURE: 98.5 F | WEIGHT: 271 LBS | BODY MASS INDEX: 33.69 KG/M2 | HEIGHT: 75 IN | HEART RATE: 82 BPM | RESPIRATION RATE: 17 BRPM

## 2023-04-10 DIAGNOSIS — E29.1 HYPOGONADISM IN MALE: ICD-10-CM

## 2023-04-10 DIAGNOSIS — G47.19 EXCESSIVE DAYTIME SLEEPINESS: Primary | ICD-10-CM

## 2023-04-10 PROCEDURE — 3079F DIAST BP 80-89 MM HG: CPT | Performed by: UROLOGY

## 2023-04-10 PROCEDURE — 1159F MED LIST DOCD IN RCRD: CPT | Performed by: UROLOGY

## 2023-04-10 PROCEDURE — 1160F RVW MEDS BY RX/DR IN RCRD: CPT | Performed by: UROLOGY

## 2023-04-10 PROCEDURE — 3077F SYST BP >= 140 MM HG: CPT | Performed by: UROLOGY

## 2023-04-10 PROCEDURE — 99214 OFFICE O/P EST MOD 30 MIN: CPT | Performed by: UROLOGY

## 2023-04-10 RX ORDER — TESTOSTERONE CYPIONATE 200 MG/ML
100 INJECTION, SOLUTION INTRAMUSCULAR WEEKLY
Qty: 10 ML | Refills: 1 | Status: SHIPPED | OUTPATIENT
Start: 2023-04-10

## 2023-04-10 NOTE — PROGRESS NOTES
"Chief Complaint   Patient presents with   • Hypogonadism     Follow-up        HPI  Ms. Butler is a 43 y.o. male with history below in assessment, who presents for follow up.     At this visit patient is doing well.  Energy levels are up.    Past Medical History:   Diagnosis Date   • Arthritis    • Asthma    • Benign essential hypertension 6/11/2021   • Cardiac murmur    • Migraine    • Moderate mixed hyperlipidemia not requiring statin therapy 6/21/2016   • Prediabetes 7/6/2021       Past Surgical History:   Procedure Laterality Date   • LYMPH NODE BIOPSY      Under right arm   • TOOTH EXTRACTION           Current Outpatient Medications:   •  albuterol (VENTOLIN HFA) 108 (90 Base) MCG/ACT inhaler, Inhale 2 puffs Every 4 (Four) Hours As Needed for Wheezing or Shortness of Air., Disp: 1 inhaler, Rfl: 5  •  Alcohol Swabs pads, Clean area prior to injection, Disp: 100 each, Rfl: 11  •  anastrozole (Arimidex) 1 MG tablet, Take 1 tablet by mouth 1 (One) Time Per Week., Disp: 30 tablet, Rfl: 1  •  azelastine (ASTELIN) 0.1 % nasal spray, instill 2 sprays into each nostril once daily, Disp: , Rfl: 0  •  fluticasone (FLONASE) 50 MCG/ACT nasal spray, One spray in each nostril twice daily., Disp: 1 each, Rfl: 6  •  glycopyrrolate (ROBINUL) 1 MG tablet, Take 1 tablet by mouth twice daily, Disp: 60 tablet, Rfl: 0  •  ibuprofen (ADVIL,MOTRIN) 200 MG tablet, Take 1 tablet by mouth Every 6 (Six) Hours As Needed for Mild Pain., Disp: , Rfl:   •  ketoconazole (NIZORAL) 2 % cream, Apply  topically to the appropriate area as directed See Admin Instructions. Apply topically to the affected area twice daily, Disp: , Rfl:   •  levocetirizine (XYZAL) 5 MG tablet, Take 1 tablet by mouth Daily., Disp: , Rfl:   •  meclizine (ANTIVERT) 25 MG tablet, Take 1 tablet by mouth 3 (Three) Times a Day As Needed for dizziness., Disp: 90 tablet, Rfl: 0  •  Needle, Disp, 18G X 1-1/2\" misc, Use for drawing up the medication, Disp: 50 each, Rfl: 3  •  Needle, " "Disp, 23G X 1-1/2\" misc, 1 Device 1 (One) Time Per Week., Disp: 30 each, Rfl: 11  •  SudoGest 30 MG tablet, TAKE 1 TO 2 TABLETS BY MOUTH TWICE DAILY AS NEEDED, Disp: , Rfl:   •  SUMAtriptan (IMITREX) 50 MG tablet, Take one tablet at onset of headache. May repeat dose one time in 2 hours if headache not relieved., Disp: 10 tablet, Rfl: 6  •  Syringe, Disposable, 3 ML misc, 1 syringe 1 (One) Time Per Week., Disp: 100 each, Rfl: 11  •  Testosterone Cypionate (Depo-Testosterone) 200 MG/ML injection, Inject 0.5 mL into the appropriate muscle as directed by prescriber 1 (One) Time Per Week. NOTE: Your dose of medicine is the same but the amount you inject is HALF. This form is TWICE as strong., Disp: 10 mL, Rfl: 1     Physical Exam  Visit Vitals  /80 (BP Location: Left arm, Patient Position: Sitting, Cuff Size: Adult)   Pulse 82   Temp 98.5 °F (36.9 °C) (Temporal)   Resp 17   Ht 190.5 cm (75\")   Wt 123 kg (271 lb)   SpO2 96%   BMI 33.87 kg/m²       Labs  Brief Urine Lab Results     None          Lab Results   Component Value Date    GLUCOSE 114 (H) 07/03/2021    CALCIUM 9.5 07/03/2021     07/03/2021    K 3.8 07/03/2021    CO2 27.8 07/03/2021     07/03/2021    BUN 16 07/03/2021    CREATININE 0.92 07/03/2021    EGFRIFAFRI 101 06/20/2019    EGFRIFNONA 91 07/03/2021    BCR 17.4 07/03/2021    ANIONGAP 9.2 07/03/2021       Lab Results   Component Value Date    WBC 5.82 06/20/2019    HGB 17.4 04/07/2023    HCT 49.5 04/07/2023    MCV 87.4 06/20/2019     06/20/2019       Lab Results   Component Value Date    PSA 0.195 07/03/2021       Lab Results   Component Value Date    TESTFRE 3.1 (L) 07/03/2021    TESTOSTEROTT 99 (L) 07/03/2021        Radiographic Studies  No Images in the past 120 days found..      I have reviewed above labs and imaging.     Assessment  43 y.o. male with with hypogonadism on 100mg weekly of Testosterone cypionate. Total T is 772, Hct <50. Estradiol is 52. No SE. Doing well.  Daytime " sleepiness much improved.    Plan  1. Stop anastrozole  2. TC refilled.  We continued with the 200 mg/mL due to pharmacy constraints.  2. FU in 6 mo w/ labs

## 2023-11-27 ENCOUNTER — OFFICE VISIT (OUTPATIENT)
Dept: UROLOGY | Facility: CLINIC | Age: 43
End: 2023-11-27
Payer: COMMERCIAL

## 2023-11-27 VITALS — SYSTOLIC BLOOD PRESSURE: 130 MMHG | DIASTOLIC BLOOD PRESSURE: 86 MMHG

## 2023-11-27 DIAGNOSIS — E29.1 HYPOGONADISM IN MALE: ICD-10-CM

## 2023-11-27 DIAGNOSIS — D75.1 POLYCYTHEMIA: Primary | ICD-10-CM

## 2023-11-27 PROCEDURE — 3075F SYST BP GE 130 - 139MM HG: CPT | Performed by: UROLOGY

## 2023-11-27 PROCEDURE — 3079F DIAST BP 80-89 MM HG: CPT | Performed by: UROLOGY

## 2023-11-27 PROCEDURE — 99214 OFFICE O/P EST MOD 30 MIN: CPT | Performed by: UROLOGY

## 2023-11-27 RX ORDER — SODIUM CHLORIDE 9 MG/ML
250 INJECTION, SOLUTION INTRAVENOUS ONCE
OUTPATIENT
Start: 2023-11-27

## 2023-11-27 RX ORDER — TESTOSTERONE CYPIONATE 200 MG/ML
100 INJECTION, SOLUTION INTRAMUSCULAR WEEKLY
Qty: 10 ML | Refills: 1 | Status: SHIPPED | OUTPATIENT
Start: 2023-11-27

## 2023-11-27 NOTE — PROGRESS NOTES
"CC  TRT    HPI  Mr. Butler is a 43 y.o. male with history below in assessment, who presents for follow up.       Past Medical History:   Diagnosis Date    Arthritis     Asthma     Benign essential hypertension 6/11/2021    Cardiac murmur     Migraine     Moderate mixed hyperlipidemia not requiring statin therapy 6/21/2016    Prediabetes 7/6/2021       Past Surgical History:   Procedure Laterality Date    LYMPH NODE BIOPSY      Under right arm    TOOTH EXTRACTION           Current Outpatient Medications:     albuterol (VENTOLIN HFA) 108 (90 Base) MCG/ACT inhaler, Inhale 2 puffs Every 4 (Four) Hours As Needed for Wheezing or Shortness of Air., Disp: 1 inhaler, Rfl: 5    Alcohol Swabs pads, Clean area prior to injection, Disp: 100 each, Rfl: 11    azelastine (ASTELIN) 0.1 % nasal spray, instill 2 sprays into each nostril once daily, Disp: , Rfl: 0    fluticasone (FLONASE) 50 MCG/ACT nasal spray, One spray in each nostril twice daily., Disp: 1 each, Rfl: 6    glycopyrrolate (ROBINUL) 1 MG tablet, Take 1 tablet by mouth twice daily, Disp: 60 tablet, Rfl: 0    ibuprofen (ADVIL,MOTRIN) 200 MG tablet, Take 1 tablet by mouth Every 6 (Six) Hours As Needed for Mild Pain., Disp: , Rfl:     ketoconazole (NIZORAL) 2 % cream, Apply  topically to the appropriate area as directed See Admin Instructions. Apply topically to the affected area twice daily, Disp: , Rfl:     levocetirizine (XYZAL) 5 MG tablet, Take 1 tablet by mouth Daily., Disp: , Rfl:     meclizine (ANTIVERT) 25 MG tablet, Take 1 tablet by mouth 3 (Three) Times a Day As Needed for dizziness., Disp: 90 tablet, Rfl: 0    Needle, Disp, 18G X 1-1/2\" misc, Use for drawing up the medication, Disp: 50 each, Rfl: 3    Needle, Disp, 23G X 1-1/2\" misc, 1 Device 1 (One) Time Per Week., Disp: 30 each, Rfl: 11    SudoGest 30 MG tablet, TAKE 1 TO 2 TABLETS BY MOUTH TWICE DAILY AS NEEDED, Disp: , Rfl:     SUMAtriptan (IMITREX) 50 MG tablet, Take one tablet at onset of headache. May " repeat dose one time in 2 hours if headache not relieved., Disp: 10 tablet, Rfl: 6    Syringe, Disposable, 3 ML misc, 1 syringe 1 (One) Time Per Week., Disp: 100 each, Rfl: 11    Testosterone Cypionate (Depo-Testosterone) 200 MG/ML injection, Inject 0.5 mL into the appropriate muscle as directed by prescriber 1 (One) Time Per Week. NOTE: Your dose of medicine is the same but the amount you inject is HALF. This form is TWICE as strong., Disp: 10 mL, Rfl: 1     Physical Exam  Visit Vitals  /86 (BP Location: Left arm, Patient Position: Sitting, Cuff Size: Adult)       Labs  Brief Urine Lab Results       None            Lab Results   Component Value Date    GLUCOSE 114 (H) 07/03/2021    CALCIUM 9.5 07/03/2021     07/03/2021    K 3.8 07/03/2021    CO2 27.8 07/03/2021     07/03/2021    BUN 16 07/03/2021    CREATININE 0.92 07/03/2021    EGFRIFAFRI 101 06/20/2019    EGFRIFNONA 91 07/03/2021    BCR 17.4 07/03/2021    ANIONGAP 9.2 07/03/2021       Lab Results   Component Value Date    WBC 5.82 06/20/2019    HGB 18.6 (H) 11/22/2023    HCT 54.1 (H) 11/22/2023    MCV 87.4 06/20/2019     06/20/2019            Lab Results   Component Value Date    PSA 0.195 07/03/2021       Lab Results   Component Value Date    TESTFRE 20.4 11/22/2023    TESTOSTEROTT 920 (H) 11/22/2023        Radiographic Studies  No Images in the past 120 days found..      I have reviewed above labs and imaging.     Assessment  43 y.o. male with hypogonadism on 100mg weekly of Testosterone cypionate.  His labs were quite high 9 days after an injection, but he may have injected more than 0.5 mL.  He has developed polycythemia, which has been a problem in the past, exacerbation of chronic disease.    Plan  1. TC refilled.  We continued with 100 mg.  He was instructed on proper dosing, and getting his labs checked on a Thursday if he injects on a Monday.  2. FU in 6 mo w/ labs.  If still quite elevated, despite correct dosing, we may need to  decrease.  3. Start phlebotomy

## 2023-12-12 ENCOUNTER — HOSPITAL ENCOUNTER (OUTPATIENT)
Dept: INFUSION THERAPY | Facility: HOSPITAL | Age: 43
Discharge: HOME | End: 2023-12-12
Admitting: UROLOGY
Payer: COMMERCIAL

## 2023-12-12 VITALS
DIASTOLIC BLOOD PRESSURE: 89 MMHG | OXYGEN SATURATION: 95 % | RESPIRATION RATE: 18 BRPM | HEART RATE: 82 BPM | SYSTOLIC BLOOD PRESSURE: 135 MMHG | TEMPERATURE: 97.7 F | HEIGHT: 75 IN | WEIGHT: 216 LBS | BODY MASS INDEX: 26.86 KG/M2

## 2023-12-12 DIAGNOSIS — D75.1 POLYCYTHEMIA: Primary | ICD-10-CM

## 2023-12-12 LAB
HCT VFR BLD AUTO: 52.8 % (ref 37.5–51)
HGB BLD-MCNC: 17.8 G/DL (ref 13–17.7)

## 2023-12-12 PROCEDURE — 85018 HEMOGLOBIN: CPT | Performed by: UROLOGY

## 2023-12-12 PROCEDURE — 85014 HEMATOCRIT: CPT | Performed by: UROLOGY

## 2023-12-12 PROCEDURE — 99195 PHLEBOTOMY: CPT

## 2023-12-12 RX ORDER — SODIUM CHLORIDE 9 MG/ML
250 INJECTION, SOLUTION INTRAVENOUS ONCE
Status: DISCONTINUED | OUTPATIENT
Start: 2023-12-12 | End: 2023-12-14 | Stop reason: HOSPADM

## 2023-12-12 RX ORDER — SODIUM CHLORIDE 9 MG/ML
250 INJECTION, SOLUTION INTRAVENOUS ONCE
OUTPATIENT
Start: 2023-12-17

## 2023-12-12 RX ORDER — PSEUDOEPHEDRINE HYDROCHLORIDE 60 MG/1
60 TABLET, FILM COATED ORAL DAILY
COMMUNITY

## 2023-12-12 NOTE — ADDENDUM NOTE
Encounter addended by: Greta Bill, CONTRERAS on: 12/12/2023 3:18 PM   Actions taken: Flowsheet accepted

## 2024-03-19 ENCOUNTER — HOSPITAL ENCOUNTER (OUTPATIENT)
Dept: INFUSION THERAPY | Facility: HOSPITAL | Age: 44
Discharge: HOME | End: 2024-03-19
Admitting: UROLOGY
Payer: COMMERCIAL

## 2024-03-19 DIAGNOSIS — D75.1 POLYCYTHEMIA: Primary | ICD-10-CM

## 2024-03-19 LAB
HCT VFR BLD AUTO: 53 % (ref 37.5–51)
HGB BLD-MCNC: 18.3 G/DL (ref 13–17.7)

## 2024-03-19 PROCEDURE — 99195 PHLEBOTOMY: CPT

## 2024-03-19 PROCEDURE — 85018 HEMOGLOBIN: CPT | Performed by: UROLOGY

## 2024-03-19 PROCEDURE — 85014 HEMATOCRIT: CPT | Performed by: UROLOGY

## 2024-03-19 RX ORDER — SODIUM CHLORIDE 9 MG/ML
250 INJECTION, SOLUTION INTRAVENOUS ONCE
Status: DISCONTINUED | OUTPATIENT
Start: 2024-03-19 | End: 2024-03-21 | Stop reason: HOSPADM

## 2024-03-19 RX ORDER — SODIUM CHLORIDE 9 MG/ML
250 INJECTION, SOLUTION INTRAVENOUS ONCE
OUTPATIENT
Start: 2024-03-24

## 2024-06-14 ENCOUNTER — HOSPITAL ENCOUNTER (OUTPATIENT)
Dept: INFUSION THERAPY | Facility: HOSPITAL | Age: 44
Discharge: HOME | End: 2024-06-14
Payer: COMMERCIAL

## 2024-06-14 VITALS
RESPIRATION RATE: 12 BRPM | HEART RATE: 68 BPM | DIASTOLIC BLOOD PRESSURE: 83 MMHG | OXYGEN SATURATION: 96 % | SYSTOLIC BLOOD PRESSURE: 132 MMHG | TEMPERATURE: 97.7 F

## 2024-06-14 DIAGNOSIS — D75.1 POLYCYTHEMIA: ICD-10-CM

## 2024-06-14 LAB
HCT VFR BLD AUTO: 49.3 % (ref 37.5–51)
HGB BLD-MCNC: 16.9 G/DL (ref 13–17.7)

## 2024-06-14 PROCEDURE — 36415 COLL VENOUS BLD VENIPUNCTURE: CPT

## 2024-06-14 PROCEDURE — 85018 HEMOGLOBIN: CPT | Performed by: UROLOGY

## 2024-06-14 PROCEDURE — 85014 HEMATOCRIT: CPT | Performed by: UROLOGY

## 2024-06-14 RX ORDER — SODIUM CHLORIDE 9 MG/ML
250 INJECTION, SOLUTION INTRAVENOUS ONCE
OUTPATIENT
Start: 2024-06-16

## 2024-06-25 ENCOUNTER — LAB (OUTPATIENT)
Dept: LAB | Facility: HOSPITAL | Age: 44
End: 2024-06-25
Payer: COMMERCIAL

## 2024-06-25 DIAGNOSIS — D75.1 POLYCYTHEMIA: ICD-10-CM

## 2024-06-25 PROCEDURE — 84402 ASSAY OF FREE TESTOSTERONE: CPT | Performed by: UROLOGY

## 2024-06-25 PROCEDURE — 85014 HEMATOCRIT: CPT | Performed by: UROLOGY

## 2024-06-25 PROCEDURE — 84403 ASSAY OF TOTAL TESTOSTERONE: CPT | Performed by: UROLOGY

## 2024-06-25 PROCEDURE — 85018 HEMOGLOBIN: CPT | Performed by: UROLOGY

## 2024-06-27 ENCOUNTER — TELEPHONE (OUTPATIENT)
Dept: UROLOGY | Facility: CLINIC | Age: 44
End: 2024-06-27

## 2024-06-27 NOTE — TELEPHONE ENCOUNTER
Provider: DR ABERNATHY    Caller: Horace Butler     Relationship to Patient: SELF    Phone Number: [phone]     Reason for Call: APPT TODAY WAS RESCHEDULED DUE TO WORK CONFLICT.

## 2024-07-25 ENCOUNTER — OFFICE VISIT (OUTPATIENT)
Dept: UROLOGY | Facility: CLINIC | Age: 44
End: 2024-07-25
Payer: COMMERCIAL

## 2024-07-25 VITALS
SYSTOLIC BLOOD PRESSURE: 142 MMHG | HEIGHT: 75 IN | BODY MASS INDEX: 26.11 KG/M2 | DIASTOLIC BLOOD PRESSURE: 92 MMHG | WEIGHT: 210 LBS

## 2024-07-25 DIAGNOSIS — D75.1 POLYCYTHEMIA: ICD-10-CM

## 2024-07-25 DIAGNOSIS — E29.1 HYPOGONADISM IN MALE: Primary | ICD-10-CM

## 2024-07-25 RX ORDER — TESTOSTERONE CYPIONATE 200 MG/ML
100 INJECTION, SOLUTION INTRAMUSCULAR WEEKLY
Qty: 10 ML | Refills: 1 | Status: SHIPPED | OUTPATIENT
Start: 2024-07-25

## 2024-07-25 NOTE — PROGRESS NOTES
"CC  TRT    HPI  Mr. Butler is a 44 y.o. male with history below in assessment, who presents for follow up.  No side effects of testosterone replacement therapy.      Past Medical History:   Diagnosis Date    Arthritis     Asthma     Benign essential hypertension 6/11/2021    Cardiac murmur     Migraine     Moderate mixed hyperlipidemia not requiring statin therapy 6/21/2016    Prediabetes 7/6/2021       Past Surgical History:   Procedure Laterality Date    LYMPH NODE BIOPSY      Under right arm    TOOTH EXTRACTION           Current Outpatient Medications:     albuterol (VENTOLIN HFA) 108 (90 Base) MCG/ACT inhaler, Inhale 2 puffs Every 4 (Four) Hours As Needed for Wheezing or Shortness of Air., Disp: 1 inhaler, Rfl: 5    Alcohol Swabs pads, Clean area prior to injection, Disp: 100 each, Rfl: 11    azelastine (ASTELIN) 0.1 % nasal spray, instill 2 sprays into each nostril once daily, Disp: , Rfl: 0    fluticasone (FLONASE) 50 MCG/ACT nasal spray, One spray in each nostril twice daily., Disp: 1 each, Rfl: 6    glycopyrrolate (ROBINUL) 1 MG tablet, Take 1 tablet by mouth twice daily, Disp: 60 tablet, Rfl: 0    ibuprofen (ADVIL,MOTRIN) 200 MG tablet, Take 1 tablet by mouth Every 6 (Six) Hours As Needed for Mild Pain., Disp: , Rfl:     ketoconazole (NIZORAL) 2 % cream, Apply  topically to the appropriate area as directed See Admin Instructions. Apply topically to the affected area twice daily, Disp: , Rfl:     levocetirizine (XYZAL) 5 MG tablet, Take 1 tablet by mouth Daily., Disp: , Rfl:     meclizine (ANTIVERT) 25 MG tablet, Take 1 tablet by mouth 3 (Three) Times a Day As Needed for dizziness., Disp: 90 tablet, Rfl: 0    Needle, Disp, 18G X 1-1/2\" misc, Use for drawing up the medication, Disp: 50 each, Rfl: 3    Needle, Disp, 23G X 1-1/2\" misc, 1 Device 1 (One) Time Per Week., Disp: 30 each, Rfl: 11    pseudoephedrine (SUDAFED) 60 MG tablet, Take 1 tablet by mouth Daily., Disp: , Rfl:     SUMAtriptan (IMITREX) 50 MG " "tablet, Take one tablet at onset of headache. May repeat dose one time in 2 hours if headache not relieved., Disp: 10 tablet, Rfl: 6    Syringe, Disposable, 3 ML misc, 1 syringe 1 (One) Time Per Week., Disp: 100 each, Rfl: 11    Testosterone Cypionate (Depo-Testosterone) 200 MG/ML injection, Inject 0.5 mL into the appropriate muscle as directed by prescriber 1 (One) Time Per Week. NOTE: Your dose of medicine is the same but the amount you inject is HALF. This form is TWICE as strong., Disp: 10 mL, Rfl: 1     Physical Exam  Visit Vitals  /92 (BP Location: Left arm, Patient Position: Sitting, Cuff Size: Adult)   Ht 190.5 cm (75\")   Wt 95.3 kg (210 lb)   BMI 26.25 kg/m²       Labs  Brief Urine Lab Results       None            Lab Results   Component Value Date    GLUCOSE 114 (H) 07/03/2021    CALCIUM 9.5 07/03/2021     07/03/2021    K 3.8 07/03/2021    CO2 27.8 07/03/2021     07/03/2021    BUN 16 07/03/2021    CREATININE 0.92 07/03/2021    EGFRIFAFRI 101 06/20/2019    EGFRIFNONA 91 07/03/2021    BCR 17.4 07/03/2021    ANIONGAP 9.2 07/03/2021       Lab Results   Component Value Date    WBC 5.82 06/20/2019    HGB 17.2 06/25/2024    HCT 51.7 (H) 06/25/2024    MCV 87.4 06/20/2019     06/20/2019            Lab Results   Component Value Date    PSA 0.195 07/03/2021       Lab Results   Component Value Date    TESTFRE 0.7 (L) 06/25/2024    TESTOSTEROTT 36 (L) 06/25/2024        Radiographic Studies  No Images in the past 120 days found..    I have reviewed above labs and imaging.     Assessment  44 y.o. male with hypogonadism on 100mg weekly of Testosterone cypionate.  He continues to struggle with polycythemia.  Testosterone was very low on most recent labs from June, as he ran out of it.    Plan  1. TC refilled.  We continued with 100 mg.  He was instructed on proper dosing, and getting his labs checked on a Thursday if he injects on a Monday.  2. FU in 6 mo w/ labs.    3.  Increased frequency of " phlebotomy

## 2024-08-20 ENCOUNTER — HOSPITAL ENCOUNTER (OUTPATIENT)
Dept: INFUSION THERAPY | Facility: HOSPITAL | Age: 44
Discharge: HOME | End: 2024-08-20
Admitting: UROLOGY
Payer: COMMERCIAL

## 2024-08-20 VITALS
RESPIRATION RATE: 18 BRPM | SYSTOLIC BLOOD PRESSURE: 147 MMHG | BODY MASS INDEX: 27.35 KG/M2 | DIASTOLIC BLOOD PRESSURE: 89 MMHG | TEMPERATURE: 97.9 F | OXYGEN SATURATION: 94 % | HEIGHT: 75 IN | WEIGHT: 220 LBS | HEART RATE: 70 BPM

## 2024-08-20 DIAGNOSIS — D75.1 POLYCYTHEMIA: Primary | ICD-10-CM

## 2024-08-20 LAB
HCT VFR BLD AUTO: 46.8 % (ref 37.5–51)
HGB BLD-MCNC: 16 G/DL (ref 13–17.7)

## 2024-08-20 PROCEDURE — 36415 COLL VENOUS BLD VENIPUNCTURE: CPT

## 2024-08-20 PROCEDURE — G0463 HOSPITAL OUTPT CLINIC VISIT: HCPCS

## 2024-08-20 PROCEDURE — 85014 HEMATOCRIT: CPT | Performed by: UROLOGY

## 2024-08-20 PROCEDURE — 85018 HEMOGLOBIN: CPT | Performed by: UROLOGY

## 2024-08-20 RX ORDER — SODIUM CHLORIDE 9 MG/ML
250 INJECTION, SOLUTION INTRAVENOUS ONCE
OUTPATIENT
Start: 2024-08-25

## 2024-08-20 RX ORDER — SODIUM CHLORIDE 9 MG/ML
250 INJECTION, SOLUTION INTRAVENOUS ONCE
Status: DISCONTINUED | OUTPATIENT
Start: 2024-08-20 | End: 2024-08-22 | Stop reason: HOSPADM

## 2024-08-20 NOTE — CODE DOCUMENTATION
1510) Hct = 46.8 which does not meet criteria for treatment today.  Patient informed and verbalized understanding.

## 2025-01-28 ENCOUNTER — LAB (OUTPATIENT)
Dept: LAB | Facility: HOSPITAL | Age: 45
End: 2025-01-28
Payer: COMMERCIAL

## 2025-01-28 LAB — HCT VFR BLD AUTO: 51.9 % (ref 37.5–51)

## 2025-01-28 PROCEDURE — 85018 HEMOGLOBIN: CPT | Performed by: UROLOGY

## 2025-01-28 PROCEDURE — 84403 ASSAY OF TOTAL TESTOSTERONE: CPT | Performed by: UROLOGY

## 2025-01-28 PROCEDURE — 85014 HEMATOCRIT: CPT | Performed by: UROLOGY

## 2025-01-28 PROCEDURE — 84402 ASSAY OF FREE TESTOSTERONE: CPT | Performed by: UROLOGY

## 2025-02-03 ENCOUNTER — OFFICE VISIT (OUTPATIENT)
Dept: UROLOGY | Facility: CLINIC | Age: 45
End: 2025-02-03
Payer: COMMERCIAL

## 2025-02-03 VITALS
TEMPERATURE: 97 F | HEIGHT: 75 IN | WEIGHT: 220 LBS | BODY MASS INDEX: 27.35 KG/M2 | HEART RATE: 80 BPM | OXYGEN SATURATION: 92 % | SYSTOLIC BLOOD PRESSURE: 150 MMHG | DIASTOLIC BLOOD PRESSURE: 90 MMHG

## 2025-02-03 DIAGNOSIS — D75.1 POLYCYTHEMIA: Primary | ICD-10-CM

## 2025-02-03 DIAGNOSIS — F51.04 PSYCHOPHYSIOLOGICAL INSOMNIA: ICD-10-CM

## 2025-02-03 DIAGNOSIS — E29.1 HYPOGONADISM IN MALE: ICD-10-CM

## 2025-02-03 PROCEDURE — 3077F SYST BP >= 140 MM HG: CPT | Performed by: UROLOGY

## 2025-02-03 PROCEDURE — 3080F DIAST BP >= 90 MM HG: CPT | Performed by: UROLOGY

## 2025-02-03 PROCEDURE — 1159F MED LIST DOCD IN RCRD: CPT | Performed by: UROLOGY

## 2025-02-03 PROCEDURE — 99214 OFFICE O/P EST MOD 30 MIN: CPT | Performed by: UROLOGY

## 2025-02-03 PROCEDURE — 1160F RVW MEDS BY RX/DR IN RCRD: CPT | Performed by: UROLOGY

## 2025-02-03 RX ORDER — SODIUM CHLORIDE 9 MG/ML
250 INJECTION, SOLUTION INTRAVENOUS ONCE
OUTPATIENT
Start: 2025-02-03

## 2025-02-03 RX ORDER — TESTOSTERONE CYPIONATE 200 MG/ML
50 VIAL (ML) INTRAMUSCULAR 2 TIMES WEEKLY
Qty: 13 ML | Refills: 0 | Status: SHIPPED | OUTPATIENT
Start: 2025-02-03

## 2025-02-03 NOTE — PROGRESS NOTES
"CC  TRT  Hypogonadism  Polycythemia    HPI  Mr. Butler is a 44 y.o. male with history below in assessment, who presents for follow up.     Past Medical History:   Diagnosis Date    Arthritis     Asthma     Benign essential hypertension 6/11/2021    Cardiac murmur     Migraine     Moderate mixed hyperlipidemia not requiring statin therapy 6/21/2016    Prediabetes 7/6/2021       Past Surgical History:   Procedure Laterality Date    LYMPH NODE BIOPSY      Under right arm    TOOTH EXTRACTION           Current Outpatient Medications:     albuterol (VENTOLIN HFA) 108 (90 Base) MCG/ACT inhaler, Inhale 2 puffs Every 4 (Four) Hours As Needed for Wheezing or Shortness of Air. (Patient taking differently: Inhale 2 puffs Every 4 (Four) Hours As Needed for Wheezing or Shortness of Air. As needed), Disp: 1 inhaler, Rfl: 5    azelastine (ASTELIN) 0.1 % nasal spray, instill 2 sprays into each nostril once daily, Disp: , Rfl: 0    fluticasone (FLONASE) 50 MCG/ACT nasal spray, One spray in each nostril twice daily., Disp: 1 each, Rfl: 6    glycopyrrolate (ROBINUL) 1 MG tablet, Take 1 tablet by mouth twice daily, Disp: 60 tablet, Rfl: 0    ibuprofen (ADVIL,MOTRIN) 200 MG tablet, Take 1 tablet by mouth Every 6 (Six) Hours As Needed for Mild Pain., Disp: , Rfl:     levocetirizine (XYZAL) 5 MG tablet, Take 1 tablet by mouth Daily., Disp: , Rfl:     meclizine (ANTIVERT) 25 MG tablet, Take 1 tablet by mouth 3 (Three) Times a Day As Needed for dizziness., Disp: 90 tablet, Rfl: 0    Needle, Disp, 18G X 1-1/2\" misc, Use for drawing up the medication, Disp: 50 each, Rfl: 3    Needle, Disp, 23G X 1-1/2\" misc, 1 Device 1 (One) Time Per Week., Disp: 30 each, Rfl: 11    SUMAtriptan (IMITREX) 50 MG tablet, Take one tablet at onset of headache. May repeat dose one time in 2 hours if headache not relieved., Disp: 10 tablet, Rfl: 6    Syringe, Disposable, 3 ML misc, 1 syringe 1 (One) Time Per Week., Disp: 100 each, Rfl: 11    Testosterone Cypionate " "(Depo-Testosterone) 200 MG/ML injection, Inject 0.5 mL into the appropriate muscle as directed by prescriber 1 (One) Time Per Week. NOTE: Your dose of medicine is the same but the amount you inject is HALF. This form is TWICE as strong., Disp: 10 mL, Rfl: 1    Alcohol Swabs pads, Clean area prior to injection, Disp: 100 each, Rfl: 11    ketoconazole (NIZORAL) 2 % cream, Apply  topically to the appropriate area as directed See Admin Instructions. Apply topically to the affected area twice daily (Patient not taking: Reported on 2/3/2025), Disp: , Rfl:     pseudoephedrine (SUDAFED) 60 MG tablet, Take 1 tablet by mouth Daily. (Patient not taking: Reported on 2/3/2025), Disp: , Rfl:      Physical Exam  Visit Vitals  /90   Pulse 80   Temp 97 °F (36.1 °C)   Ht 190.5 cm (75\")   Wt 99.8 kg (220 lb)   SpO2 92%   BMI 27.50 kg/m²       Labs  Brief Urine Lab Results       None            Lab Results   Component Value Date    GLUCOSE 114 (H) 07/03/2021    CALCIUM 9.5 07/03/2021     07/03/2021    K 3.8 07/03/2021    CO2 27.8 07/03/2021     07/03/2021    BUN 16 07/03/2021    CREATININE 0.92 07/03/2021    EGFRIFAFRI 101 06/20/2019    EGFRIFNONA 91 07/03/2021    BCR 17.4 07/03/2021    ANIONGAP 9.2 07/03/2021       Lab Results   Component Value Date    WBC 5.82 06/20/2019    HGB 17.7 01/28/2025    HCT 51.9 (H) 01/28/2025    HCT 51.9 (H) 01/28/2025    MCV 87.4 06/20/2019     06/20/2019            Lab Results   Component Value Date    PSA 0.195 07/03/2021       Lab Results   Component Value Date    Testosterone, Free 7.0 01/28/2025    Testosterone, Total 443 01/28/2025    Testosterone, Total 824.00 09/29/2022    CO2 28 12/28/2016    Total Protein 7.3 07/03/2021        Radiographic Studies  No Images in the past 120 days found..    I have reviewed above labs and imaging.       Assessment  44 y.o. male with hypogonadism on 100mg weekly of Testosterone cypionate.  He continues to struggle with polycythemia.  I " counseled him that he is at higher risk for stroke or blood clot with his hematocrit over 50.    Plan  1. TC refilled.  We continued with 100 mg weekly, however I want him to split the dose and do 50 mg 2 times a week, as well as get phlebotomy as soon as possible and every 2 months.  I updated his therapy plan.  2. FU in 6 mo w/ labs.    3. Recommend home sleep study and FU with Dr Peña

## 2025-02-14 ENCOUNTER — TELEPHONE (OUTPATIENT)
Dept: UROLOGY | Facility: CLINIC | Age: 45
End: 2025-02-14
Payer: COMMERCIAL

## 2025-02-14 NOTE — TELEPHONE ENCOUNTER
Caller: ОЛЬГА OCONNOR    Relationship: SELF    Best call back number: [phone]    Which medication are you concerned about: TESTOSTERONE    Who prescribed you this medication: DR. ABERNATHY     When did you start taking this medication: 2/3/25    What are your concerns: PT WAITED TOO LONG TO  TESTOSTERONE FROM THE PHARMACY, NOW PHARMACY NEEDS IT RE-SUBMITTED IN ORDER FOR PT TO PICK IT UP.    How long have you had these concerns: N/A

## 2025-02-18 ENCOUNTER — TELEPHONE (OUTPATIENT)
Dept: UROLOGY | Facility: CLINIC | Age: 45
End: 2025-02-18
Payer: COMMERCIAL

## 2025-02-18 NOTE — TELEPHONE ENCOUNTER
Caller: ОЛЬГА OCONNOR     Relationship: SELF     Best call back number: [phone]     Which medication are you concerned about: TESTOSTERONE     Who prescribed you this medication: DR. ABERNATHY      When did you start taking this medication: 2/3/25     What are your concerns: PT WAITED TOO LONG TO  TESTOSTERONE FROM THE PHARMACY, NOW PHARMACY NEEDS IT RE-SUBMITTED IN ORDER FOR PT TO PICK IT UP.     THIS IS A 2ND REQUEST

## 2025-02-20 ENCOUNTER — HOSPITAL ENCOUNTER (OUTPATIENT)
Facility: HOSPITAL | Age: 45
Discharge: HOME | End: 2025-02-20
Admitting: UROLOGY
Payer: COMMERCIAL

## 2025-02-20 VITALS
OXYGEN SATURATION: 95 % | RESPIRATION RATE: 14 BRPM | DIASTOLIC BLOOD PRESSURE: 91 MMHG | HEART RATE: 78 BPM | SYSTOLIC BLOOD PRESSURE: 163 MMHG

## 2025-02-20 DIAGNOSIS — D75.1 POLYCYTHEMIA: Primary | ICD-10-CM

## 2025-02-20 LAB
HCT VFR BLD AUTO: 52.6 % (ref 37.5–51)
HGB BLD-MCNC: 17.9 G/DL (ref 13–17.7)

## 2025-02-20 PROCEDURE — 85014 HEMATOCRIT: CPT | Performed by: UROLOGY

## 2025-02-20 PROCEDURE — 85018 HEMOGLOBIN: CPT | Performed by: UROLOGY

## 2025-02-20 PROCEDURE — 99195 PHLEBOTOMY: CPT

## 2025-02-20 RX ORDER — SODIUM CHLORIDE 9 MG/ML
250 INJECTION, SOLUTION INTRAVENOUS ONCE
OUTPATIENT
Start: 2025-02-23

## 2025-04-17 ENCOUNTER — HOSPITAL ENCOUNTER (OUTPATIENT)
Facility: HOSPITAL | Age: 45
Discharge: HOME | End: 2025-04-17
Admitting: UROLOGY
Payer: COMMERCIAL

## 2025-04-17 DIAGNOSIS — D75.1 POLYCYTHEMIA: Primary | ICD-10-CM

## 2025-04-17 LAB
HCT VFR BLD AUTO: 53.1 % (ref 37.5–51)
HGB BLD-MCNC: 18.2 G/DL (ref 13–17.7)

## 2025-04-17 PROCEDURE — 85014 HEMATOCRIT: CPT | Performed by: UROLOGY

## 2025-04-17 PROCEDURE — 99195 PHLEBOTOMY: CPT

## 2025-04-17 PROCEDURE — 85018 HEMOGLOBIN: CPT | Performed by: UROLOGY

## 2025-04-17 RX ORDER — SODIUM CHLORIDE 9 MG/ML
250 INJECTION, SOLUTION INTRAVENOUS ONCE
OUTPATIENT
Start: 2025-04-20

## 2025-04-17 RX ORDER — SODIUM CHLORIDE 9 MG/ML
250 INJECTION, SOLUTION INTRAVENOUS ONCE
Status: DISCONTINUED | OUTPATIENT
Start: 2025-04-17 | End: 2025-04-18 | Stop reason: HOSPADM

## 2025-06-12 ENCOUNTER — HOSPITAL ENCOUNTER (OUTPATIENT)
Facility: HOSPITAL | Age: 45
Discharge: HOME | End: 2025-06-12
Admitting: UROLOGY
Payer: COMMERCIAL

## 2025-06-12 VITALS
SYSTOLIC BLOOD PRESSURE: 129 MMHG | RESPIRATION RATE: 16 BRPM | HEART RATE: 74 BPM | OXYGEN SATURATION: 96 % | DIASTOLIC BLOOD PRESSURE: 83 MMHG

## 2025-06-12 DIAGNOSIS — D75.1 POLYCYTHEMIA: Primary | ICD-10-CM

## 2025-06-12 LAB
HCT VFR BLD AUTO: 50.3 % (ref 37.5–51)
HGB BLD-MCNC: 16.7 G/DL (ref 13–17.7)

## 2025-06-12 PROCEDURE — 99195 PHLEBOTOMY: CPT

## 2025-06-12 PROCEDURE — 85014 HEMATOCRIT: CPT | Performed by: UROLOGY

## 2025-06-12 PROCEDURE — 85018 HEMOGLOBIN: CPT | Performed by: UROLOGY

## 2025-06-12 RX ORDER — SODIUM CHLORIDE 9 MG/ML
250 INJECTION, SOLUTION INTRAVENOUS ONCE
OUTPATIENT
Start: 2025-06-19

## 2025-07-24 ENCOUNTER — OFFICE VISIT (OUTPATIENT)
Dept: PULMONOLOGY | Facility: CLINIC | Age: 45
End: 2025-07-24
Payer: COMMERCIAL

## 2025-07-24 VITALS
WEIGHT: 208 LBS | BODY MASS INDEX: 25.86 KG/M2 | DIASTOLIC BLOOD PRESSURE: 74 MMHG | HEART RATE: 78 BPM | RESPIRATION RATE: 16 BRPM | OXYGEN SATURATION: 96 % | SYSTOLIC BLOOD PRESSURE: 126 MMHG | HEIGHT: 75 IN

## 2025-07-24 DIAGNOSIS — G47.52 DREAM ENACTMENT BEHAVIOR: ICD-10-CM

## 2025-07-24 DIAGNOSIS — G47.8 SLEEP TALKING: ICD-10-CM

## 2025-07-24 DIAGNOSIS — R44.2 HYPNAPOMPIC HALLUCINATIONS: ICD-10-CM

## 2025-07-24 DIAGNOSIS — F51.04 INSOMNIA, PSYCHOPHYSIOLOGICAL: Primary | ICD-10-CM

## 2025-07-24 DIAGNOSIS — G47.19 EXCESSIVE DAYTIME SLEEPINESS: ICD-10-CM

## 2025-07-24 PROCEDURE — 99244 OFF/OP CNSLTJ NEW/EST MOD 40: CPT | Performed by: INTERNAL MEDICINE

## 2025-07-24 PROCEDURE — 3078F DIAST BP <80 MM HG: CPT | Performed by: INTERNAL MEDICINE

## 2025-07-24 PROCEDURE — 3074F SYST BP LT 130 MM HG: CPT | Performed by: INTERNAL MEDICINE

## 2025-07-24 RX ORDER — AZELASTINE HYDROCHLORIDE AND FLUTICASONE PROPIONATE 137; 50 UG/1; UG/1
SPRAY, METERED NASAL
COMMUNITY
Start: 2025-07-23

## 2025-07-24 NOTE — PROGRESS NOTES
"  CONSULT NOTE    Requested by:   No ref. provider found   Provider, No Known      Chief Complaint   Patient presents with    Sleeping Problem    Consult       Subjective:  Horace Butler is a 45 y.o. male.   Patient is complaining of symptoms of insomnia.  Patient says that he has noticed issues going to & maintaining sleep.  he goes to bed around 10:30 PM and leaves the bed in the morning around 7 AM.  The patient says that he feels that he only sleeps for 4-5 hour per night.    He does talk in his sleep and sometimes acts out his dreams.     He feels that going to sleep is a lot easier compared to before.     Gets something to drink, after he wakes up around 4 AM. Sometimes takes a snack.    Usually stays off the phone but does use it on occasion.    Says that he goes \"straight into dream state\".    He does not snore \"a lot\".     He does take naps on occasions and he does feel fresh.     he has started noticing that he may be \"seeing his dreams\" before he is asleep.     The following portions of the patient's history were reviewed and updated as appropriate: allergies, current medications, past family history, past medical history, past social history, and past surgical history.    Review of Systems   HENT:  Negative for sinus pressure, sneezing and sore throat.    Respiratory:  Negative for cough, chest tightness, shortness of breath and wheezing.    Cardiovascular:  Negative for palpitations and leg swelling.   Psychiatric/Behavioral:  Positive for sleep disturbance.    All other systems reviewed and are negative.      Past Medical History:   Diagnosis Date    Arthritis     Asthma     Benign essential hypertension 6/11/2021    Cardiac murmur     Migraine     Moderate mixed hyperlipidemia not requiring statin therapy 6/21/2016    Prediabetes 7/6/2021       Social History     Tobacco Use    Smoking status: Former     Current packs/day: 0.00     Types: Cigarettes     Quit date: 6/11/2013     Years since quitting: " "12.1     Passive exposure: Past    Smokeless tobacco: Former    Tobacco comments:     Patient quit cigarettes but still smokes an electronic cigarette   Substance Use Topics    Alcohol use: Not Currently         Objective:  Visit Vitals  /74   Pulse 78   Resp 16   Ht 190.5 cm (75\")   Wt 94.3 kg (208 lb)   SpO2 96%   BMI 26.00 kg/m²       BMI Readings from Last 8 Encounters:   07/24/25 26.00 kg/m²   02/03/25 27.50 kg/m²   08/20/24 27.50 kg/m²   07/25/24 26.25 kg/m²   12/12/23 27.00 kg/m²   04/10/23 33.87 kg/m²   03/04/22 26.87 kg/m²   06/11/21 26.87 kg/m²       Physical Exam  Vitals reviewed.   Constitutional:       Appearance: He is well-developed.   Neck:      Vascular: No JVD.   Musculoskeletal:      Cervical back: Neck supple.      Comments: Gait was normal.   Skin:     General: Skin is warm and dry.   Neurological:      Mental Status: He is alert and oriented to person, place, and time.           Assessment/Plan:  Diagnoses and all orders for this visit:    1. Insomnia, psychophysiological (Primary)  -     Cancel: Polysomnography 4 or more parameters; Future  -     Urine Drug Screen - Urine, Clean Catch; Future  -     Cancel: Multiple sleep latency test without CPAP; Future  -     Polysomnography 4 or more parameters; Future  -     Multiple sleep latency test without CPAP; Future    2. Excessive daytime sleepiness  -     Cancel: Polysomnography 4 or more parameters; Future  -     Urine Drug Screen - Urine, Clean Catch; Future  -     Cancel: Multiple sleep latency test without CPAP; Future  -     Polysomnography 4 or more parameters; Future  -     Multiple sleep latency test without CPAP; Future    3. Sleep talking  -     Cancel: Polysomnography 4 or more parameters; Future  -     Urine Drug Screen - Urine, Clean Catch; Future  -     Cancel: Multiple sleep latency test without CPAP; Future  -     Polysomnography 4 or more parameters; Future  -     Multiple sleep latency test without CPAP; Future    4. Dream " enactment behavior  -     Cancel: Polysomnography 4 or more parameters; Future  -     Urine Drug Screen - Urine, Clean Catch; Future  -     Cancel: Multiple sleep latency test without CPAP; Future  -     Polysomnography 4 or more parameters; Future  -     Multiple sleep latency test without CPAP; Future    5. Hypnapompic hallucinations  -     Cancel: Polysomnography 4 or more parameters; Future  -     Urine Drug Screen - Urine, Clean Catch; Future  -     Cancel: Multiple sleep latency test without CPAP; Future  -     Polysomnography 4 or more parameters; Future  -     Multiple sleep latency test without CPAP; Future        Return in about 4 months (around 11/24/2025) for [facility].    DISCUSSION(if any):  Laboratory workup also showed   Lab Results   Component Value Date    HGB 16.7 06/12/2025    HGB 18.2 (H) 04/17/2025    HGB 17.9 (H) 02/20/2025   ,   Lab Results   Component Value Date    HCT 50.3 06/12/2025    HCT 53.1 (H) 04/17/2025    HCT 52.6 (H) 02/20/2025       Lab Results   Component Value Date    EOSABS 0.15 06/20/2019    EOSABS 0.14 10/26/2018    EOSABS 0.17 12/28/2016    & Laboratory workup also showed   Lab Results   Component Value Date    CO2 27.8 07/03/2021    CO2 31.0 (H) 06/20/2019    CO2 28 12/28/2016   ,   Lab Results   Component Value Date    HGBA1C 5.80 (H) 07/03/2021    HGBA1C 5.90 (H) 06/20/2019    HGBA1C 5.60 09/06/2016   ,   Lab Results   Component Value Date    TSH 2.190 07/03/2021    TSH 3.960 06/20/2019    TSH 1.58 12/28/2016     ===========================  ===========================    The patient symptoms are somewhat concerning for either sleep deprivation that could be caused by many different etiologies or could also suggest the possibility of sleep apnea +/- narcolepsy.    There were no symptoms to suggest overt cataplexy at this time.    I told the patient that he will definitely need to undergo PSG followed by MSLT to rule out the possibility of narcolepsy  especially given some of his symptoms of dream enactment behavior, hypnopompic hallucinations etc.    Sleep hygiene measures were discussed with the patient in great detail.    The patient was told that he will need to follow a strict time to go to bed as well as a fixed time to get out of bed.    Multiple measures were discussed including sleep restriction and he was strongly encouraged to follow other recommendations including avoiding naps, watching TV in the bed etc.    It appears that he has been given prescription for amitriptyline in the past which seem to work at a fair dose, but he has not been on it in some time and I asked him to avoid it for now, especially given the fact that I want him to undergo MSLT.      Dictated utilizing Dragon dictation.    This document was electronically signed by Jacob Peña MD on 07/24/25 at 11:50 EDT

## 2025-07-25 ENCOUNTER — PATIENT ROUNDING (BHMG ONLY) (OUTPATIENT)
Dept: PULMONOLOGY | Facility: CLINIC | Age: 45
End: 2025-07-25
Payer: COMMERCIAL

## 2025-07-30 ENCOUNTER — LAB (OUTPATIENT)
Dept: LAB | Facility: HOSPITAL | Age: 45
End: 2025-07-30
Payer: COMMERCIAL

## 2025-07-30 DIAGNOSIS — D75.1 POLYCYTHEMIA: ICD-10-CM

## 2025-07-30 DIAGNOSIS — E29.1 HYPOGONADISM IN MALE: ICD-10-CM

## 2025-07-30 LAB
HCT VFR BLD AUTO: 47.7 % (ref 37.5–51)
HCT VFR BLD AUTO: 47.7 % (ref 37.5–51)
HGB BLD-MCNC: 15.9 G/DL (ref 13–17.7)
HGB BLD-MCNC: 15.9 G/DL (ref 13–17.7)

## 2025-07-30 PROCEDURE — 84402 ASSAY OF FREE TESTOSTERONE: CPT | Performed by: UROLOGY

## 2025-07-30 PROCEDURE — 36415 COLL VENOUS BLD VENIPUNCTURE: CPT

## 2025-07-30 PROCEDURE — 85018 HEMOGLOBIN: CPT

## 2025-07-30 PROCEDURE — 84403 ASSAY OF TOTAL TESTOSTERONE: CPT | Performed by: UROLOGY

## 2025-07-30 PROCEDURE — 85014 HEMATOCRIT: CPT

## 2025-08-04 ENCOUNTER — OFFICE VISIT (OUTPATIENT)
Dept: UROLOGY | Facility: CLINIC | Age: 45
End: 2025-08-04
Payer: COMMERCIAL

## 2025-08-04 VITALS
OXYGEN SATURATION: 97 % | TEMPERATURE: 97.8 F | RESPIRATION RATE: 14 BRPM | HEART RATE: 90 BPM | BODY MASS INDEX: 25.85 KG/M2 | HEIGHT: 75 IN | WEIGHT: 207.89 LBS

## 2025-08-04 DIAGNOSIS — E29.1 HYPOGONADISM IN MALE: ICD-10-CM

## 2025-08-04 DIAGNOSIS — D75.1 POLYCYTHEMIA: Primary | ICD-10-CM

## 2025-08-04 PROCEDURE — 1160F RVW MEDS BY RX/DR IN RCRD: CPT | Performed by: UROLOGY

## 2025-08-04 PROCEDURE — 1159F MED LIST DOCD IN RCRD: CPT | Performed by: UROLOGY

## 2025-08-04 PROCEDURE — 99214 OFFICE O/P EST MOD 30 MIN: CPT | Performed by: UROLOGY

## 2025-08-04 RX ORDER — TESTOSTERONE CYPIONATE 200 MG/ML
50 VIAL (ML) INTRAMUSCULAR 2 TIMES WEEKLY
Qty: 13 ML | Refills: 0 | Status: SHIPPED | OUTPATIENT
Start: 2025-08-04

## 2025-08-09 ENCOUNTER — HOSPITAL ENCOUNTER (EMERGENCY)
Facility: HOSPITAL | Age: 45
Discharge: HOME | End: 2025-08-09
Attending: STUDENT IN AN ORGANIZED HEALTH CARE EDUCATION/TRAINING PROGRAM
Payer: COMMERCIAL

## 2025-08-09 ENCOUNTER — APPOINTMENT (OUTPATIENT)
Dept: CT IMAGING | Facility: HOSPITAL | Age: 45
End: 2025-08-09
Payer: COMMERCIAL

## 2025-08-09 VITALS
WEIGHT: 210 LBS | HEIGHT: 75 IN | RESPIRATION RATE: 18 BRPM | HEART RATE: 68 BPM | BODY MASS INDEX: 26.11 KG/M2 | OXYGEN SATURATION: 97 % | SYSTOLIC BLOOD PRESSURE: 157 MMHG | DIASTOLIC BLOOD PRESSURE: 121 MMHG | TEMPERATURE: 98.5 F

## 2025-08-09 DIAGNOSIS — K59.00 CONSTIPATION, UNSPECIFIED CONSTIPATION TYPE: Primary | ICD-10-CM

## 2025-08-09 LAB
ALBUMIN SERPL-MCNC: 4.9 G/DL (ref 3.5–5.2)
ALBUMIN/GLOB SERPL: 1.3 G/DL
ALP SERPL-CCNC: 135 U/L (ref 39–117)
ALT SERPL W P-5'-P-CCNC: 14 U/L (ref 1–41)
ANION GAP SERPL CALCULATED.3IONS-SCNC: 11.9 MMOL/L (ref 5–15)
AST SERPL-CCNC: 21 U/L (ref 1–40)
BASOPHILS # BLD AUTO: 0.07 10*3/MM3 (ref 0–0.2)
BASOPHILS NFR BLD AUTO: 1 % (ref 0–1.5)
BILIRUB SERPL-MCNC: 0.4 MG/DL (ref 0–1.2)
BILIRUB UR QL STRIP: NEGATIVE
BUN SERPL-MCNC: 10 MG/DL (ref 6–20)
BUN/CREAT SERPL: 8.1 (ref 7–25)
CALCIUM SPEC-SCNC: 10.6 MG/DL (ref 8.6–10.5)
CHLORIDE SERPL-SCNC: 95 MMOL/L (ref 98–107)
CLARITY UR: CLEAR
CO2 SERPL-SCNC: 30.1 MMOL/L (ref 22–29)
COLOR UR: YELLOW
CREAT SERPL-MCNC: 1.23 MG/DL (ref 0.76–1.27)
D-LACTATE SERPL-SCNC: 1.3 MMOL/L (ref 0.5–2)
DEPRECATED RDW RBC AUTO: 45.3 FL (ref 37–54)
EGFRCR SERPLBLD CKD-EPI 2021: 74.2 ML/MIN/1.73
EOSINOPHIL # BLD AUTO: 0.42 10*3/MM3 (ref 0–0.4)
EOSINOPHIL NFR BLD AUTO: 6 % (ref 0.3–6.2)
ERYTHROCYTE [DISTWIDTH] IN BLOOD BY AUTOMATED COUNT: 13.2 % (ref 12.3–15.4)
GLOBULIN UR ELPH-MCNC: 3.8 GM/DL
GLUCOSE SERPL-MCNC: 75 MG/DL (ref 65–99)
GLUCOSE UR STRIP-MCNC: NEGATIVE MG/DL
HCT VFR BLD AUTO: 53.5 % (ref 37.5–51)
HGB BLD-MCNC: 18.1 G/DL (ref 13–17.7)
HGB UR QL STRIP.AUTO: NEGATIVE
IMM GRANULOCYTES # BLD AUTO: 0.02 10*3/MM3 (ref 0–0.05)
IMM GRANULOCYTES NFR BLD AUTO: 0.3 % (ref 0–0.5)
KETONES UR QL STRIP: ABNORMAL
LEUKOCYTE ESTERASE UR QL STRIP.AUTO: NEGATIVE
LIPASE SERPL-CCNC: 27 U/L (ref 13–60)
LYMPHOCYTES # BLD AUTO: 2.3 10*3/MM3 (ref 0.7–3.1)
LYMPHOCYTES NFR BLD AUTO: 33 % (ref 19.6–45.3)
MCH RBC QN AUTO: 31.5 PG (ref 26.6–33)
MCHC RBC AUTO-ENTMCNC: 33.8 G/DL (ref 31.5–35.7)
MCV RBC AUTO: 93 FL (ref 79–97)
MONOCYTES # BLD AUTO: 0.54 10*3/MM3 (ref 0.1–0.9)
MONOCYTES NFR BLD AUTO: 7.7 % (ref 5–12)
NEUTROPHILS NFR BLD AUTO: 3.63 10*3/MM3 (ref 1.7–7)
NEUTROPHILS NFR BLD AUTO: 52 % (ref 42.7–76)
NITRITE UR QL STRIP: NEGATIVE
NRBC BLD AUTO-RTO: 0 /100 WBC (ref 0–0.2)
PH UR STRIP.AUTO: 5.5 [PH] (ref 5–8)
PLATELET # BLD AUTO: 396 10*3/MM3 (ref 140–450)
PMV BLD AUTO: 9.1 FL (ref 6–12)
POTASSIUM SERPL-SCNC: 4.1 MMOL/L (ref 3.5–5.2)
PROT SERPL-MCNC: 8.7 G/DL (ref 6–8.5)
PROT UR QL STRIP: ABNORMAL
RBC # BLD AUTO: 5.75 10*6/MM3 (ref 4.14–5.8)
SODIUM SERPL-SCNC: 137 MMOL/L (ref 136–145)
SP GR UR STRIP: 1.02 (ref 1–1.03)
UROBILINOGEN UR QL STRIP: ABNORMAL
WBC NRBC COR # BLD AUTO: 6.98 10*3/MM3 (ref 3.4–10.8)

## 2025-08-09 PROCEDURE — 83690 ASSAY OF LIPASE: CPT | Performed by: NURSE PRACTITIONER

## 2025-08-09 PROCEDURE — 83605 ASSAY OF LACTIC ACID: CPT | Performed by: NURSE PRACTITIONER

## 2025-08-09 PROCEDURE — 25510000001 IOPAMIDOL 61 % SOLUTION: Performed by: STUDENT IN AN ORGANIZED HEALTH CARE EDUCATION/TRAINING PROGRAM

## 2025-08-09 PROCEDURE — 85025 COMPLETE CBC W/AUTO DIFF WBC: CPT | Performed by: NURSE PRACTITIONER

## 2025-08-09 PROCEDURE — 99285 EMERGENCY DEPT VISIT HI MDM: CPT | Performed by: STUDENT IN AN ORGANIZED HEALTH CARE EDUCATION/TRAINING PROGRAM

## 2025-08-09 PROCEDURE — 81003 URINALYSIS AUTO W/O SCOPE: CPT | Performed by: NURSE PRACTITIONER

## 2025-08-09 PROCEDURE — 74177 CT ABD & PELVIS W/CONTRAST: CPT

## 2025-08-09 PROCEDURE — 80053 COMPREHEN METABOLIC PANEL: CPT | Performed by: NURSE PRACTITIONER

## 2025-08-09 RX ORDER — IOPAMIDOL 612 MG/ML
100 INJECTION, SOLUTION INTRAVASCULAR
Status: COMPLETED | OUTPATIENT
Start: 2025-08-09 | End: 2025-08-09

## 2025-08-09 RX ADMIN — IOPAMIDOL 100 ML: 612 INJECTION, SOLUTION INTRAVENOUS at 11:23

## 2025-08-11 DIAGNOSIS — D75.1 POLYCYTHEMIA: Primary | ICD-10-CM

## 2025-08-21 ENCOUNTER — HOSPITAL ENCOUNTER (OUTPATIENT)
Facility: HOSPITAL | Age: 45
Discharge: HOME | End: 2025-08-21
Admitting: UROLOGY
Payer: COMMERCIAL

## 2025-08-21 VITALS — HEART RATE: 80 BPM | DIASTOLIC BLOOD PRESSURE: 92 MMHG | SYSTOLIC BLOOD PRESSURE: 142 MMHG

## 2025-08-21 DIAGNOSIS — D75.1 POLYCYTHEMIA: Primary | ICD-10-CM

## 2025-08-21 LAB
HCT VFR BLD AUTO: 48.1 % (ref 37.5–51)
HGB BLD-MCNC: 16.2 G/DL (ref 13–17.7)

## 2025-08-21 PROCEDURE — 85014 HEMATOCRIT: CPT | Performed by: UROLOGY

## 2025-08-21 PROCEDURE — 85018 HEMOGLOBIN: CPT | Performed by: UROLOGY

## 2025-08-21 PROCEDURE — 99195 PHLEBOTOMY: CPT

## 2025-08-21 RX ORDER — SODIUM CHLORIDE 9 MG/ML
250 INJECTION, SOLUTION INTRAVENOUS ONCE
OUTPATIENT
Start: 2025-08-27

## 2025-08-21 RX ORDER — SODIUM CHLORIDE 9 MG/ML
250 INJECTION, SOLUTION INTRAVENOUS ONCE
Status: DISCONTINUED | OUTPATIENT
Start: 2025-08-21 | End: 2025-08-22 | Stop reason: HOSPADM